# Patient Record
Sex: FEMALE | Race: BLACK OR AFRICAN AMERICAN | NOT HISPANIC OR LATINO | Employment: FULL TIME | URBAN - METROPOLITAN AREA
[De-identification: names, ages, dates, MRNs, and addresses within clinical notes are randomized per-mention and may not be internally consistent; named-entity substitution may affect disease eponyms.]

---

## 2017-01-01 ENCOUNTER — TRANSFERRED RECORDS (OUTPATIENT)
Dept: HEALTH INFORMATION MANAGEMENT | Facility: CLINIC | Age: 42
End: 2017-01-01

## 2017-03-14 ENCOUNTER — TRANSFERRED RECORDS (OUTPATIENT)
Dept: HEALTH INFORMATION MANAGEMENT | Facility: CLINIC | Age: 42
End: 2017-03-14

## 2017-03-15 ENCOUNTER — OFFICE VISIT (OUTPATIENT)
Dept: FAMILY MEDICINE | Facility: CLINIC | Age: 42
End: 2017-03-15
Payer: COMMERCIAL

## 2017-03-15 VITALS
HEART RATE: 102 BPM | SYSTOLIC BLOOD PRESSURE: 98 MMHG | OXYGEN SATURATION: 94 % | HEIGHT: 66 IN | RESPIRATION RATE: 14 BRPM | DIASTOLIC BLOOD PRESSURE: 68 MMHG | BODY MASS INDEX: 31.98 KG/M2 | WEIGHT: 199 LBS | TEMPERATURE: 100 F

## 2017-03-15 DIAGNOSIS — R07.0 THROAT PAIN: Primary | ICD-10-CM

## 2017-03-15 LAB
DEPRECATED S PYO AG THROAT QL EIA: NORMAL
MICRO REPORT STATUS: NORMAL
SPECIMEN SOURCE: NORMAL

## 2017-03-15 PROCEDURE — 87880 STREP A ASSAY W/OPTIC: CPT | Performed by: PHYSICIAN ASSISTANT

## 2017-03-15 PROCEDURE — 99213 OFFICE O/P EST LOW 20 MIN: CPT | Performed by: PHYSICIAN ASSISTANT

## 2017-03-15 NOTE — NURSING NOTE
"Chief Complaint   Patient presents with     URI     Cough, sorethroat, sinus congestion, elevated temp     ER F/U     Seen in urgent care yesterday, rapid strep was negative, on vicodin for sorethroat.       Initial BP 98/68 (BP Location: Left arm, Patient Position: Chair, Cuff Size: Adult Small)  Pulse 102  Temp 100  F (37.8  C) (Tympanic)  Resp 14  Ht 5' 6\" (1.676 m)  Wt 199 lb (90.3 kg)  LMP 02/22/2017 (Approximate)  SpO2 94%  Breastfeeding? No  BMI 32.12 kg/m2 Estimated body mass index is 32.12 kg/(m^2) as calculated from the following:    Height as of this encounter: 5' 6\" (1.676 m).    Weight as of this encounter: 199 lb (90.3 kg).  Medication Reconciliation: complete     Emilia Arias LPN  "

## 2017-03-15 NOTE — PROGRESS NOTES
"  SUBJECTIVE:                                                    Seble Lopez is a 41 year old female who presents to clinic today for the following health issues:    ED/UC Followup:    Facility:  [facility]  Date of visit: 03/14/2017  Reason for visit: URI  Current Status: Severe sore throat, elevated temp, cough. Rapid completed yesterday was negative     As above; patient at urgent care rapid strep negative yesterday.        ROS:  C: POSITIVE for fever- LG   Skin: Negative for worrisome rashes or lesions  ENT/MOUTH:POSITIVE for sore throat without difficulty swallowing.  Negative for ear pain, sinus pressure, hearing loss and tinnitus.  Resp: Positive for non-productive cough without shortness of breath  CV: Negative for chest pain or peripheral edema  GI: Negative for nausea, abdominal pain, heartburn, or change in bowel habits  MS: Negative for significant arthralgias or myalgias      PFSH: hx recurrent tonsillitis  No known ill exposure     Patient Active Problem List   Diagnosis     Allergic rhinitis     CARDIOVASCULAR SCREENING; LDL GOAL LESS THAN 160     Contraceptive management     Current Outpatient Prescriptions   Medication     etonogestrel-ethinyl estradiol (NUVARING) 0.12-0.015 MG/24HR vaginal ring     No current facility-administered medications for this visit.        OBJECTIVE:                                                    BP 98/68 (BP Location: Left arm, Patient Position: Chair, Cuff Size: Adult Small)  Pulse 102  Temp 100  F (37.8  C) (Tympanic)  Resp 14  Ht 5' 6\" (1.676 m)  Wt 199 lb (90.3 kg)  LMP 02/22/2017 (Approximate)  SpO2 94%  Breastfeeding? No  BMI 32.12 kg/m2  Body mass index is 32.12 kg/(m^2).  GENERAL: healthy, alert, in no acute distress  EYES: Grossly normal to inspection, EOMI, PERRL  HENT: Ear canals normal bilateral. TM pearly gray without effusion bilaterally.  Nasal mucosa mildly edematous with clear rhinorrhea.  Mouth- mucous membranes moist, no lesions or " ulcerations. Pharynx erythematous without petechia. and 2+ tonsillary hypertrophy. Uvula midline, clear post-nasal drainage.  Maxillary and frontal sinuses nontender to palpation bilaterally.  NECK: Non-tender, no adenopathy.  RESP: lungs clear to auscultation - no rales, no rhonchi, no wheezes  CV: regular rate and rhythm, normal S1 S2.  No peripheral edema.  SKIN: no suspicious lesions, no rashes  PSYCH: Alert and oriented times 3;  Able to articulate logical thoughts. Affect is normal.    Diagnostic test results:   Results for orders placed or performed in visit on 03/15/17 (from the past 24 hour(s))   Strep, Rapid Screen   Result Value Ref Range    Specimen Description Throat     Rapid Strep A Screen       NEGATIVE: No Group A streptococcal antigen detected by immunoassay, await   culture report.      Micro Report Status FINAL 03/15/2017         ASSESSMENT/PLAN:                                                        ICD-10-CM    1. Throat pain R07.0 Strep, Rapid Screen     Pending culture at urgent care- culture not done today at our clinic per patient request.   Continue monitoring symptoms and conservative therapies. She agrees.     Nicole Joy Siegler, PA-C  [facility]

## 2017-03-15 NOTE — MR AVS SNAPSHOT
"              After Visit Summary   3/15/2017    Seble Lopez    MRN: [id number]           Patient Information     Date Of Birth          1975        Visit Information        Provider Department      3/15/2017 9:30 AM Siegler, Nicole Joy, PA-C [facility]        Today's Diagnoses     Throat pain    -  1       Follow-ups after your visit        Who to contact     If you have questions or need follow up information about today's clinic visit or your schedule please contact [facility] directly at [phone].  Normal or non-critical lab and imaging results will be communicated to you by RotoHoghart, letter or phone within 4 business days after the clinic has received the results. If you do not hear from us within 7 days, please contact the clinic through RotoHoghart or phone. If you have a critical or abnormal lab result, we will notify you by phone as soon as possible.  Submit refill requests through [employer] or call your pharmacy and they will forward the refill request to us. Please allow 3 business days for your refill to be completed.          Additional Information About Your Visit        MyChart Information     [employer] gives you secure access to your electronic health record. If you see a primary care provider, you can also send messages to your care team and make appointments. If you have questions, please call your primary care clinic.  If you do not have a primary care provider, please call [phone] and they will assist you.        Care EveryWhere ID     This is your Care EveryWhere ID. This could be used by other organizations to access your [place] medical records  [id number]        Your Vitals Were     Pulse Temperature Respirations Height Last Period Pulse Oximetry    102 100  F (37.8  C) (Tympanic) 14 5' 6\" (1.676 m) 02/22/2017 (Approximate) 94%    Breastfeeding? BMI (Body Mass Index)                No 32.12 kg/m2           Blood Pressure from " Last 3 Encounters:   03/15/17 98/68   06/17/16 119/71   04/04/16 108/74    Weight from Last 3 Encounters:   03/15/17 199 lb (90.3 kg)   06/17/16 201 lb (91.2 kg)   04/04/16 197 lb 14.4 oz (89.8 kg)              We Performed the Following     Strep, Rapid Screen        Primary Care Provider Office Phone # Fax #    Alec Chris [phone]       [facility] [address]  [facility] [address]        Thank you!     Thank you for choosing [facility]  for your care. Our goal is always to provide you with excellent care. Hearing back from our patients is one way we can continue to improve our services. Please take a few minutes to complete the written survey that you may receive in the mail after your visit with us. Thank you!             Your Updated Medication List - Protect others around you: Learn how to safely use, store and throw away your medicines at www.disposemymeds.org.          This list is accurate as of: 3/15/17 10:39 AM.  Always use your most recent med list.                   Brand Name Dispense Instructions for use    etonogestrel-ethinyl estradiol 0.12-0.015 MG/24HR vaginal ring    NUVARING     Place 1 each vaginally every 28 days Reported on 3/15/2017

## 2017-03-17 LAB
BACTERIA SPEC CULT: NORMAL
MICRO REPORT STATUS: NORMAL
SPECIMEN SOURCE: NORMAL

## 2017-04-07 ENCOUNTER — OFFICE VISIT (OUTPATIENT)
Dept: FAMILY MEDICINE | Facility: CLINIC | Age: 42
End: 2017-04-07

## 2017-04-07 DIAGNOSIS — Z53.9 NO SHOW: Primary | ICD-10-CM

## 2017-04-07 NOTE — MR AVS SNAPSHOT
After Visit Summary   4/7/2017    Seble Lopez    MRN: [id number]           Patient Information     Date Of Birth          1975        Visit Information        Provider Department      4/7/2017 8:40 AM Ibrahima Amador PA-C [facility]        Today's Diagnoses     NO SHOW    -  1       Follow-ups after your visit        Who to contact     If you have questions or need follow up information about today's clinic visit or your schedule please contact [facility] directly at [phone].  Normal or non-critical lab and imaging results will be communicated to you by eOn Communicationshart, letter or phone within 4 business days after the clinic has received the results. If you do not hear from us within 7 days, please contact the clinic through LetsCramt or phone. If you have a critical or abnormal lab result, we will notify you by phone as soon as possible.  Submit refill requests through [employer] or call your pharmacy and they will forward the refill request to us. Please allow 3 business days for your refill to be completed.          Additional Information About Your Visit        MyChart Information     [employer] gives you secure access to your electronic health record. If you see a primary care provider, you can also send messages to your care team and make appointments. If you have questions, please call your primary care clinic.  If you do not have a primary care provider, please call [phone] and they will assist you.        Care EveryWhere ID     This is your Care EveryWhere ID. This could be used by other organizations to access your [place] medical records  [id number]        Your Vitals Were     Last Period                   02/22/2017 (Approximate)            Blood Pressure from Last 3 Encounters:   03/15/17 98/68   06/17/16 119/71   04/04/16 108/74    Weight from Last 3 Encounters:   03/15/17 199 lb (90.3 kg)   06/17/16 201 lb (91.2 kg)   04/04/16 197 lb 14.4 oz (89.8 kg)               Today, you had the following     No orders found for display       Primary Care Provider Office Phone # Fax #    Alec Chris [phone]       [facility] [address] [address]        Thank you!     Thank you for choosing [facility]  for your care. Our goal is always to provide you with excellent care. Hearing back from our patients is one way we can continue to improve our services. Please take a few minutes to complete the written survey that you may receive in the mail after your visit with us. Thank you!             Your Updated Medication List - Protect others around you: Learn how to safely use, store and throw away your medicines at www.disposemymeds.org.          This list is accurate as of: 4/7/17  9:09 AM.  Always use your most recent med list.                   Brand Name Dispense Instructions for use    etonogestrel-ethinyl estradiol 0.12-0.015 MG/24HR vaginal ring    NUVARING     Place 1 each vaginally every 28 days Reported on 3/15/2017

## 2017-04-27 ENCOUNTER — TELEPHONE (OUTPATIENT)
Dept: FAMILY MEDICINE | Facility: CLINIC | Age: 42
End: 2017-04-27

## 2017-04-27 NOTE — TELEPHONE ENCOUNTER
MP,  Please see below message.  All other provider schedules full  Please advise on DB today  Thanks,  Micaela PENA RN

## 2017-04-27 NOTE — TELEPHONE ENCOUNTER
Spoke with pt    Next 5 appointments (look out 90 days)     Apr 28, 2017 11:00 AM CDT   Office Visit with Lu Alfonso MD   [facility]    [address] [place]  [facility] [address]   [phone]                Micaela PENA RN

## 2017-04-27 NOTE — TELEPHONE ENCOUNTER
Reason for Call:  Other appointment    Detailed comments: pt had day off has lump on back and insurance is ending at end of month wondering by chance if u could work her in today for appt?    Phone Number Patient can be reached at: Cell number on file:    No relevant phone numbers on file.[phone]       Best Time: any    Can we leave a detailed message on this number? YES    Call taken on 4/27/2017 at 1:05 PM by Concetta Rubin

## 2017-04-28 ENCOUNTER — OFFICE VISIT (OUTPATIENT)
Dept: FAMILY MEDICINE | Facility: CLINIC | Age: 42
End: 2017-04-28
Payer: COMMERCIAL

## 2017-04-28 VITALS
HEIGHT: 66 IN | TEMPERATURE: 97.7 F | DIASTOLIC BLOOD PRESSURE: 72 MMHG | OXYGEN SATURATION: 96 % | HEART RATE: 74 BPM | SYSTOLIC BLOOD PRESSURE: 115 MMHG | WEIGHT: 198 LBS | BODY MASS INDEX: 31.82 KG/M2

## 2017-04-28 DIAGNOSIS — L72.0 EPIDERMOID CYST OF SKIN: Primary | ICD-10-CM

## 2017-04-28 DIAGNOSIS — L91.0 KELOID SCAR: ICD-10-CM

## 2017-04-28 PROCEDURE — 99214 OFFICE O/P EST MOD 30 MIN: CPT | Performed by: FAMILY MEDICINE

## 2017-04-28 NOTE — MR AVS SNAPSHOT
After Visit Summary   4/28/2017    Seble Lopez    MRN: [id number]           Patient Information     Date Of Birth          1975        Visit Information        Provider Department      4/28/2017 11:00 AM Lu Alfonso MD [facility]        Today's Diagnoses     Epidermoid cyst of skin    -  1    Keloid scar           Follow-ups after your visit        Additional Services     DERMATOLOGY REFERRAL       Your provider has referred you to: [facility]: [facility] [place] [phone]   [email or web address]  [email or web address]: [facility] - [place] [phone]   [email or web address]    Please be aware that coverage of these services is subject to the terms and limitations of your health insurance plan.  Call member services at your health plan with any benefit or coverage questions.      Please bring the following with you to your appointment:    (1) Any X-Rays, CTs or MRIs which have been performed.  Contact the facility where they were done to arrange for  prior to your scheduled appointment.    (2) List of current medications  (3) This referral request   (4) Any documents/labs given to you for this referral                  Who to contact     If you have questions or need follow up information about today's clinic visit or your schedule please contact [facility] directly at [phone].  Normal or non-critical lab and imaging results will be communicated to you by MyChart, letter or phone within 4 business days after the clinic has received the results. If you do not hear from us within 7 days, please contact the clinic through MyChart or phone. If you have a critical or abnormal lab result, we will notify you by phone as soon as possible.  Submit refill requests through [email or web address] or call your pharmacy and they will forward the refill request to us. Please allow 3 business days  "for your refill to be completed.          Additional Information About Your Visit        MyChart Information     [employer] gives you secure access to your electronic health record. If you see a primary care provider, you can also send messages to your care team and make appointments. If you have questions, please call your primary care clinic.  If you do not have a primary care provider, please call [phone] and they will assist you.        Care EveryWhere ID     This is your Care EveryWhere ID. This could be used by other organizations to access your [place] medical records  [id number]        Your Vitals Were     Pulse Temperature Height Pulse Oximetry BMI (Body Mass Index)       74 97.7  F (36.5  C) (Oral) 5' 6\" (1.676 m) 96% 31.96 kg/m2        Blood Pressure from Last 3 Encounters:   04/28/17 115/72   03/15/17 98/68   06/17/16 119/71    Weight from Last 3 Encounters:   04/28/17 198 lb (89.8 kg)   03/15/17 199 lb (90.3 kg)   06/17/16 201 lb (91.2 kg)              We Performed the Following     DERMATOLOGY REFERRAL        Primary Care Provider Office Phone # Fax #    Alec Chris [phone]       [facility] [address] [id number]        Thank you!     Thank you for choosing [facility]  for your care. Our goal is always to provide you with excellent care. Hearing back from our patients is one way we can continue to improve our services. Please take a few minutes to complete the written survey that you may receive in the mail after your visit with us. Thank you!             Your Updated Medication List - Protect others around you: Learn how to safely use, store and throw away your medicines at www.disposemymeds.org.          This list is accurate as of: 4/28/17 11:48 AM.  Always use your most recent med list.                   Brand Name Dispense Instructions for use    etonogestrel-ethinyl estradiol 0.12-0.015 MG/24HR vaginal ring    " NUVARING     Place 1 each vaginally every 28 days Reported on 3/15/2017

## 2017-04-28 NOTE — PROGRESS NOTES
"  SUBJECTIVE:                                                    Seble Lopez is a 41 year old female who presents to clinic today for the following health issues:  Noted a mass on left back, about a year ago or slightly more  No change in size. No associated pain    Chief Complaint   Patient presents with     Mass     lump under skin  left side of  middle back, been there for 1 year or so, no pain     OBJECTIVE-  /72  Pulse 74  Temp 97.7  F (36.5  C) (Oral)  Ht 5' 6\" (1.676 m)  Wt 198 lb (89.8 kg)  SpO2 96%  BMI 31.96 kg/m2  GENERAL APPEARANCE ADULT: Alert, no acute distress  RESP: lungs clear to auscultation   CV: normal rate, regular rhythm, no murmur or gallop  SKIN: left lateral mid back- easily palpable , skin color lump about an inch, oblong, with a punctum- most likely an epidermal cyst. Discussed minor procedure for excision and biopsy   Right shoulder had a scar, dark black about a cms, round- and patient reports its from  A biopsy that she did not need and it never cleared  It was benign    A/P:Lump on the back most likely benign but excision is advised  (L72.0) Epidermoid cyst of skin  (primary encounter diagnosis)  Comment: left lateral mid back about close to an inch  Plan: she has the option to be seen by DERMATOLOGY REFERRAL   or return office visit for excision here. The risk complications, scar of minor procedure in the clinic is explained to patient in detail       (L91.0) Keloid scar  Comment: right shoulder- previous biosy  Plan: DERMATOLOGY REFERRAL       She reports she gets annual mamm[facility] at [facility]- last one normal in jan 2017    The patient indicates understanding of these issues and agrees with the plan.    "

## 2017-04-28 NOTE — NURSING NOTE
"Chief Complaint   Patient presents with     Mass     lump under skin  left side of  middle back, been there for 1 year or so, no pain     /72  Pulse 74  Temp 97.7  F (36.5  C) (Oral)  Ht 5' 6\" (1.676 m)  Wt 198 lb (89.8 kg)  SpO2 96%  BMI 31.96 kg/m2 Estimated body mass index is 31.96 kg/(m^2) as calculated from the following:    Height as of this encounter: 5' 6\" (1.676 m).    Weight as of this encounter: 198 lb (89.8 kg).  BP completed using cuff size: regular       Health Maintenance due pending provider review:  Mammogram    Patient  Goes to [facility] they  PAP 2-3 years ago [facility] at [facility]  1/2017        Binh Caldwell CMA  "

## 2018-04-07 ENCOUNTER — HEALTH MAINTENANCE LETTER (OUTPATIENT)
Age: 43
End: 2018-04-07

## 2018-12-19 ENCOUNTER — TRANSFERRED RECORDS (OUTPATIENT)
Dept: HEALTH INFORMATION MANAGEMENT | Facility: CLINIC | Age: 43
End: 2018-12-19

## 2019-06-26 ENCOUNTER — OFFICE VISIT (OUTPATIENT)
Dept: FAMILY MEDICINE | Facility: CLINIC | Age: 44
End: 2019-06-26
Payer: COMMERCIAL

## 2019-06-26 VITALS
WEIGHT: 209.9 LBS | HEART RATE: 72 BPM | SYSTOLIC BLOOD PRESSURE: 117 MMHG | OXYGEN SATURATION: 97 % | DIASTOLIC BLOOD PRESSURE: 75 MMHG | TEMPERATURE: 98.5 F | HEIGHT: 66 IN | BODY MASS INDEX: 33.73 KG/M2

## 2019-06-26 DIAGNOSIS — Z00.00 ROUTINE GENERAL MEDICAL EXAMINATION AT A HEALTH CARE FACILITY: Primary | ICD-10-CM

## 2019-06-26 DIAGNOSIS — F40.243 FEAR OF FLYING: ICD-10-CM

## 2019-06-26 DIAGNOSIS — K64.9 HEMORRHOIDS, UNSPECIFIED HEMORRHOID TYPE: ICD-10-CM

## 2019-06-26 LAB
CHOLEST SERPL-MCNC: 191 MG/DL
GLUCOSE SERPL-MCNC: 87 MG/DL (ref 70–99)
HDLC SERPL-MCNC: 78 MG/DL
LDLC SERPL CALC-MCNC: 103 MG/DL
NONHDLC SERPL-MCNC: 113 MG/DL
TRIGL SERPL-MCNC: 49 MG/DL

## 2019-06-26 PROCEDURE — 82947 ASSAY GLUCOSE BLOOD QUANT: CPT | Performed by: FAMILY MEDICINE

## 2019-06-26 PROCEDURE — 99396 PREV VISIT EST AGE 40-64: CPT | Performed by: FAMILY MEDICINE

## 2019-06-26 PROCEDURE — 36415 COLL VENOUS BLD VENIPUNCTURE: CPT | Performed by: FAMILY MEDICINE

## 2019-06-26 PROCEDURE — 80061 LIPID PANEL: CPT | Performed by: FAMILY MEDICINE

## 2019-06-26 PROCEDURE — 99213 OFFICE O/P EST LOW 20 MIN: CPT | Mod: 25 | Performed by: FAMILY MEDICINE

## 2019-06-26 RX ORDER — LORAZEPAM 0.5 MG/1
0.5 TABLET ORAL EVERY 8 HOURS PRN
Qty: 10 TABLET | Refills: 0 | Status: SHIPPED | OUTPATIENT
Start: 2019-06-26 | End: 2020-07-08

## 2019-06-26 ASSESSMENT — MIFFLIN-ST. JEOR: SCORE: 1618.85

## 2019-06-26 NOTE — NURSING NOTE
"Chief Complaint   Patient presents with     Physical     /75   Pulse 72   Temp 98.5  F (36.9  C) (Oral)   Ht 1.676 m (5' 6\")   Wt 95.2 kg (209 lb 14.4 oz)   LMP 06/17/2019   SpO2 97%   BMI 33.88 kg/m   Estimated body mass index is 33.88 kg/m  as calculated from the following:    Height as of this encounter: 1.676 m (5' 6\").    Weight as of this encounter: 95.2 kg (209 lb 14.4 oz).        Health Maintenance due pending provider review:  Pap Smear    Pt reports pap completed at [facility], unsure of date, will call    Anupama Delgado CMA  "

## 2019-06-26 NOTE — PROGRESS NOTES
e2     SUBJECTIVE:   CC: Seble Lopez is an 43 year old woman who presents for preventive health visit.     Healthy Habits:     Getting at least 3 servings of Calcium per day:  Yes    Bi-annual eye exam:  Yes    Dental care twice a year:  Yes    Sleep apnea or symptoms of sleep apnea:  None    Diet:  Regular (no restrictions)    Frequency of exercise:  1 day/week    Duration of exercise:  15-30 minutes    Taking medications regularly:  Yes    Medication side effects:  None    PHQ-2 Total Score: 0    Additional concerns today:  No          PROBLEMS TO ADD ON...  Anxiety -   Fear of flying - panic attacks  Wonders about wellbutrin    Possible hemorrhoid    Today's PHQ-2 Score:   PHQ-2 ( 1999 [employer]) 6/26/2019   Q1: Little interest or pleasure in doing things 0   Q2: Feeling down, depressed or hopeless 0   PHQ-2 Score 0   Q1: Little interest or pleasure in doing things Not at all   Q2: Feeling down, depressed or hopeless Not at all   PHQ-2 Score 0       Abuse: Current or Past(Physical, Sexual or Emotional)- NO  Do you feel safe in your environment? YES    Social History     Tobacco Use     Smoking status: Never Smoker     Smokeless tobacco: Never Used   Substance Use Topics     Alcohol use: Yes     Alcohol/week: 0.0 oz     Comment: Occas         Alcohol Use 6/26/2019   Prescreen: >3 drinks/day or >7 drinks/week? No   Prescreen: >3 drinks/day or >7 drinks/week? -   No flowsheet data found.    Reviewed orders with patient.  Reviewed health maintenance and updated orders accordingly - Yes  Patient Active Problem List   Diagnosis     Allergic rhinitis     CARDIOVASCULAR SCREENING; LDL GOAL LESS THAN 160     Contraceptive management     History reviewed. No pertinent surgical history.    Social History     Tobacco Use     Smoking status: Never Smoker     Smokeless tobacco: Never Used   Substance Use Topics     Alcohol use: Yes     Alcohol/week: 0.0 oz     Comment: Occas     Family History   Problem Relation Age of Onset      "Coronary Artery Disease Mother      Family History Negative Father            Mammogram Screening: Patient under age 50, mutual decision reflected in health maintenance.      Pertinent mammograms are reviewed under the imaging tab.  History of abnormal Pap smear: NO - age 30-65 PAP every 5 years with negative HPV co-testing recommended - gets done at gynecology clinic  Pt signed CHRIS and will get copy of records     Reviewed and updated as needed this visit by clinical staff  Tobacco  Allergies  Meds  Problems  Med Hx  Surg Hx  Fam Hx  Soc Hx          Reviewed and updated as needed this visit by Provider  Tobacco  Allergies  Meds  Problems  Med Hx  Surg Hx  Fam Hx            Review of Systems  CONSTITUTIONAL: NEGATIVE for fever, chills, change in weight  INTEGUMENTARU/SKIN: NEGATIVE for worrisome rashes, moles or lesions  EYES: NEGATIVE for vision changes or irritation  ENT: NEGATIVE for ear, mouth and throat problems  RESP: NEGATIVE for significant cough or SOB  BREAST: NEGATIVE for masses, tenderness or discharge  CV: NEGATIVE for chest pain, palpitations or peripheral edema  GI: NEGATIVE for nausea, abdominal pain, heartburn, or change in bowel habits  : NEGATIVE for unusual urinary or vaginal symptoms. Periods are regular.  MUSCULOSKELETAL: NEGATIVE for significant arthralgias or myalgia  NEURO: NEGATIVE for weakness, dizziness or paresthesias  PSYCHIATRIC: NEGATIVE for changes in mood or affect     OBJECTIVE:   /75   Pulse 72   Temp 98.5  F (36.9  C) (Oral)   Ht 1.676 m (5' 6\")   Wt 95.2 kg (209 lb 14.4 oz)   LMP 06/17/2019   SpO2 97%   BMI 33.88 kg/m    Physical Exam  GENERAL: alert and no distress  EYES: Eyes grossly normal to inspection, PERRL and conjunctivae and sclerae normal  HENT: ear canals and TM's normal, nose and mouth without ulcers or lesions  NECK: no adenopathy, no asymmetry, masses, or scars and thyroid normal to palpation  RESP: lungs clear to auscultation - no " "rales, rhonchi or wheezes  CV: regular rate and rhythm, normal S1 S2, no S3 or S4, no murmur, click or rub, no peripheral edema and peripheral pulses strong  ABDOMEN: soft, nontender, no hepatosplenomegaly, no masses and bowel sounds normal  MS: no gross musculoskeletal defects noted, no edema  SKIN: no suspicious lesions or rashes  NEURO: Normal strength and tone, mentation intact and speech normal  PSYCH: mentation appears normal, affect normal/bright    Diagnostic Test Results:  Labs reviewed in [facility]  Pending labs    ASSESSMENT/PLAN:   1. Routine general medical examination at a health care facility  Routine screening   - Lipid panel reflex to direct LDL Fasting  - Glucose    2. Fear of flying  Will try prn ativan   Discussed risk vs benefit and potential side effects  - LORazepam (ATIVAN) 0.5 MG tablet; Take 1 tablet (0.5 mg) by mouth every 8 hours as needed for anxiety (for air travel)  Dispense: 10 tablet; Refill: 0  - OFFICE/OUTPT VISIT,EST,LEVL II    3. Hemorrhoids, unspecified hemorrhoid type  Referral to colorectal  - COLORECTAL SURGERY REFERRAL  - OFFICE/OUTPT VISIT,EST,LEVL II    COUNSELING:  Reviewed preventive health counseling, as reflected in patient instructions    Estimated body mass index is 33.88 kg/m  as calculated from the following:    Height as of this encounter: 1.676 m (5' 6\").    Weight as of this encounter: 95.2 kg (209 lb 14.4 oz).    Weight management plan: Discussed healthy diet and exercise guidelines     reports that she has never smoked. She has never used smokeless tobacco.      Counseling Resources:  ATP IV Guidelines  Pooled Cohorts Equation Calculator  Breast Cancer Risk Calculator  FRAX Risk Assessment  ICSI Preventive Guidelines  Dietary Guidelines for Americans, 2010  USDA's MyPlate  ASA Prophylaxis  Lung CA Screening    Sera Echevarria, DO  [facility]  "

## 2019-06-26 NOTE — RESULT ENCOUNTER NOTE
Dear Seble,   Your test results are all back -   -Cholesterol levels (LDL,HDL, Triglycerides) are normal.  ADVISE: rechecking in 1 year.   -Glucose (diabetic screening test) is normal.  Let us know if you have any questions.  -Sera Echevarria, DO

## 2019-08-02 ENCOUNTER — OFFICE VISIT (OUTPATIENT)
Dept: FAMILY MEDICINE | Facility: CLINIC | Age: 44
End: 2019-08-02
Payer: COMMERCIAL

## 2019-08-02 VITALS
HEIGHT: 66 IN | SYSTOLIC BLOOD PRESSURE: 110 MMHG | BODY MASS INDEX: 33.7 KG/M2 | WEIGHT: 209.7 LBS | DIASTOLIC BLOOD PRESSURE: 76 MMHG | RESPIRATION RATE: 14 BRPM | OXYGEN SATURATION: 96 % | HEART RATE: 96 BPM

## 2019-08-02 DIAGNOSIS — R59.1 LYMPHADENOPATHY: Primary | ICD-10-CM

## 2019-08-02 LAB
BASOPHILS # BLD AUTO: 0 10E9/L (ref 0–0.2)
BASOPHILS NFR BLD AUTO: 0.3 %
DIFFERENTIAL METHOD BLD: NORMAL
EOSINOPHIL # BLD AUTO: 0.2 10E9/L (ref 0–0.7)
EOSINOPHIL NFR BLD AUTO: 1.8 %
ERYTHROCYTE [DISTWIDTH] IN BLOOD BY AUTOMATED COUNT: 12.6 % (ref 10–15)
HCT VFR BLD AUTO: 39.8 % (ref 35–47)
HGB BLD-MCNC: 13.2 G/DL (ref 11.7–15.7)
LYMPHOCYTES # BLD AUTO: 2.6 10E9/L (ref 0.8–5.3)
LYMPHOCYTES NFR BLD AUTO: 25.5 %
MCH RBC QN AUTO: 30.8 PG (ref 26.5–33)
MCHC RBC AUTO-ENTMCNC: 33.2 G/DL (ref 31.5–36.5)
MCV RBC AUTO: 93 FL (ref 78–100)
MONOCYTES # BLD AUTO: 0.6 10E9/L (ref 0–1.3)
MONOCYTES NFR BLD AUTO: 6.3 %
NEUTROPHILS # BLD AUTO: 6.7 10E9/L (ref 1.6–8.3)
NEUTROPHILS NFR BLD AUTO: 66.1 %
PLATELET # BLD AUTO: 238 10E9/L (ref 150–450)
RBC # BLD AUTO: 4.29 10E12/L (ref 3.8–5.2)
WBC # BLD AUTO: 10.1 10E9/L (ref 4–11)

## 2019-08-02 PROCEDURE — 85025 COMPLETE CBC W/AUTO DIFF WBC: CPT | Performed by: PHYSICIAN ASSISTANT

## 2019-08-02 PROCEDURE — 99214 OFFICE O/P EST MOD 30 MIN: CPT | Performed by: PHYSICIAN ASSISTANT

## 2019-08-02 PROCEDURE — 36415 COLL VENOUS BLD VENIPUNCTURE: CPT | Performed by: PHYSICIAN ASSISTANT

## 2019-08-02 ASSESSMENT — MIFFLIN-ST. JEOR: SCORE: 1612.94

## 2019-08-02 NOTE — PROGRESS NOTES
"Subjective     Seble Lopez is a 44 year old female who presents to clinic today for the following health issues:    HPI   Mass      Duration: 4 days    Description (location/character/radiation): under the chin    Intensity:  moderate    Accompanying signs and symptoms: pain when touching    History (similar episodes/previous evaluation): None    Precipitating or alleviating factors: None    Therapies tried and outcome: tylenol and ibuprofen      She noticed this 4 days ago while on business trip.  Seemed to have a bit more swelling along neck, but that has improved.  She does have some tenderness.  This does cause quite a bit of anxiety.    BP Readings from Last 3 Encounters:   08/02/19 110/76   06/26/19 117/75   04/28/17 115/72    Wt Readings from Last 3 Encounters:   08/02/19 95.1 kg (209 lb 11.2 oz)   06/26/19 95.2 kg (209 lb 14.4 oz)   04/28/17 89.8 kg (198 lb)                      Reviewed and updated as needed this visit by Provider         Review of Systems   ROS COMP: Constitutional, HEENT, cardiovascular, pulmonary, gi and gu systems are negative, except as otherwise noted.      Objective    /76 (BP Location: Left arm, Patient Position: Sitting, Cuff Size: Adult Large)   Pulse 96   Resp 14   Ht 1.676 m (5' 6\")   Wt 95.1 kg (209 lb 11.2 oz)   LMP 07/12/2019 (Approximate)   SpO2 96%   BMI 33.85 kg/m    Body mass index is 33.85 kg/m .  Physical Exam   GENERAL: alert and no distress  EYES: Eyes grossly normal to inspection  HENT: ear canals and TM's normal, nose and mouth without ulcers or lesions  RESP: lungs clear to auscultation - no rales, rhonchi or wheezes  CV: regular rate and rhythm, normal S1 S2, no S3 or S4, no murmur, click or rub, no peripheral edema and peripheral pulses strong  PSYCH: mentation appears normal, affect normal/bright  LYMPH: submandibular tender, mobile lymphadenopathy.    Diagnostic Test Results:  Labs reviewed in Epic  Results for orders placed or performed in visit on " "08/02/19   CBC with platelets differential   Result Value Ref Range    WBC 10.1 4.0 - 11.0 10e9/L    RBC Count 4.29 3.8 - 5.2 10e12/L    Hemoglobin 13.2 11.7 - 15.7 g/dL    Hematocrit 39.8 35.0 - 47.0 %    MCV 93 78 - 100 fl    MCH 30.8 26.5 - 33.0 pg    MCHC 33.2 31.5 - 36.5 g/dL    RDW 12.6 10.0 - 15.0 %    Platelet Count 238 150 - 450 10e9/L    % Neutrophils 66.1 %    % Lymphocytes 25.5 %    % Monocytes 6.3 %    % Eosinophils 1.8 %    % Basophils 0.3 %    Absolute Neutrophil 6.7 1.6 - 8.3 10e9/L    Absolute Lymphocytes 2.6 0.8 - 5.3 10e9/L    Absolute Monocytes 0.6 0.0 - 1.3 10e9/L    Absolute Eosinophils 0.2 0.0 - 0.7 10e9/L    Absolute Basophils 0.0 0.0 - 0.2 10e9/L    Diff Method Automated Method            Assessment & Plan     1. Lymphadenopathy  Normal CBC.  Most likely reactive lymph node.  Discussed worrisome findings, which she is not showing any of, and when to follow up.  These can take a couple of weeks to resolve.  - CBC with platelets differential     BMI:   Estimated body mass index is 33.85 kg/m  as calculated from the following:    Height as of this encounter: 1.676 m (5' 6\").    Weight as of this encounter: 95.1 kg (209 lb 11.2 oz).               Return in about 2 weeks (around 8/16/2019).    Ibrahima Amador PA-C  [facility]        "

## 2019-08-02 NOTE — Clinical Note
Please abstract the following data from this visit with this patient into the appropriate field in Epic:Pap smear done on this date: 12/12/14 (approximately), by this group: [facility], results were negative with negative HPV.  See care everywhere

## 2019-08-02 NOTE — RESULT ENCOUNTER NOTE
Dear Seble    Your test results are attached, feel free to contact me via [employer] .    Great news, your white blood cell count is normal.  This also makes a bacterial infection less likely.  Most likely just that lymph node reacting to virus.  Give this the weekend, and if symptoms persist or worsen contact myself or Dr. Swathi Amador PA-C

## 2019-08-05 ENCOUNTER — OFFICE VISIT (OUTPATIENT)
Dept: FAMILY MEDICINE | Facility: CLINIC | Age: 44
End: 2019-08-05
Payer: COMMERCIAL

## 2019-08-05 VITALS
TEMPERATURE: 98.3 F | RESPIRATION RATE: 16 BRPM | HEIGHT: 66 IN | DIASTOLIC BLOOD PRESSURE: 70 MMHG | WEIGHT: 207 LBS | OXYGEN SATURATION: 97 % | SYSTOLIC BLOOD PRESSURE: 104 MMHG | HEART RATE: 79 BPM | BODY MASS INDEX: 33.27 KG/M2

## 2019-08-05 DIAGNOSIS — R59.1 LYMPHADENOPATHY: Primary | ICD-10-CM

## 2019-08-05 PROCEDURE — 99213 OFFICE O/P EST LOW 20 MIN: CPT | Performed by: PHYSICIAN ASSISTANT

## 2019-08-05 ASSESSMENT — MIFFLIN-ST. JEOR: SCORE: 1600.7

## 2019-08-05 NOTE — NURSING NOTE
"Chief Complaint   Patient presents with     Insect Bites     /70   Pulse 79   Temp 98.3  F (36.8  C) (Oral)   Resp 16   Ht 1.676 m (5' 6\")   Wt 93.9 kg (207 lb)   LMP 07/12/2019 (Approximate)   SpO2 97%   BMI 33.41 kg/m   Estimated body mass index is 33.41 kg/m  as calculated from the following:    Height as of this encounter: 1.676 m (5' 6\").    Weight as of this encounter: 93.9 kg (207 lb).  bp completed using cuff size: large       Health Maintenance addressed:  Mammogram and Pap Smear    Pt has had it but cant remember when and where will get info and let us know.    Lidia Hall MA     "

## 2019-08-05 NOTE — PROGRESS NOTES
"Subjective     Seble Lopez is a 44 year old female who presents to clinic today for the following health issues:    HPI   Pt states she has had swollen lymph nodes since Tuesday, tired/fatigued, and waking up in the morning covered in sweat pt did find a bug in her bed and wondering if its related.     45 y/o female here for follow up.  I did see patient on Friday for the same symptoms as above.  We did run a CBC that was normal.  She did find a bug in her bed, and does wonder if this could be related.  Over the weekend, her swelling has improved, and actually she is feeling better.  She was mostly concerned about possibility of lyme.  Denies any muscle aches, no rashes.    BP Readings from Last 3 Encounters:   08/05/19 104/70   08/02/19 110/76   06/26/19 117/75    Wt Readings from Last 3 Encounters:   08/05/19 93.9 kg (207 lb)   08/02/19 95.1 kg (209 lb 11.2 oz)   06/26/19 95.2 kg (209 lb 14.4 oz)                    Reviewed and updated as needed this visit by Provider         Review of Systems   ROS COMP: Constitutional, HEENT, cardiovascular, pulmonary, gi and gu systems are negative, except as otherwise noted.      Objective    /70   Pulse 79   Temp 98.3  F (36.8  C) (Oral)   Resp 16   Ht 1.676 m (5' 6\")   Wt 93.9 kg (207 lb)   LMP 07/12/2019 (Approximate)   SpO2 97%   BMI 33.41 kg/m    Body mass index is 33.41 kg/m .  Physical Exam   GENERAL: alert and no distress  EYES: Eyes grossly normal to inspection  RESP: lungs clear to auscultation - no rales, rhonchi or wheezes  CV: regular rate and rhythm, normal S1 S2, no S3 or S4, no murmur, click or rub, no peripheral edema and peripheral pulses strong  PSYCH: mentation appears normal, affect normal/bright  LYMPH: improved submandibular lymphadenopathy.      Diagnostic Test Results:  Labs reviewed in Epic        Assessment & Plan     1. Lymphadenopathy  Since she is improving, I do not think further work up is needed at this time.  Went over her CBC " "again.  She is in agreement to just watch symptoms over the next week.       BMI:   Estimated body mass index is 33.41 kg/m  as calculated from the following:    Height as of this encounter: 1.676 m (5' 6\").    Weight as of this encounter: 93.9 kg (207 lb).               No follow-ups on file.    Ibrahima Amador PA-C  [facility]    "

## 2019-11-21 ENCOUNTER — OFFICE VISIT (OUTPATIENT)
Dept: FAMILY MEDICINE | Facility: CLINIC | Age: 44
End: 2019-11-21
Payer: COMMERCIAL

## 2019-11-21 VITALS
DIASTOLIC BLOOD PRESSURE: 80 MMHG | WEIGHT: 206 LBS | TEMPERATURE: 98.1 F | BODY MASS INDEX: 33.11 KG/M2 | HEIGHT: 66 IN | SYSTOLIC BLOOD PRESSURE: 120 MMHG | RESPIRATION RATE: 16 BRPM | HEART RATE: 76 BPM | OXYGEN SATURATION: 98 %

## 2019-11-21 DIAGNOSIS — R07.0 THROAT PAIN: Primary | ICD-10-CM

## 2019-11-21 DIAGNOSIS — L30.9 DERMATITIS: ICD-10-CM

## 2019-11-21 LAB
DEPRECATED S PYO AG THROAT QL EIA: NORMAL
SPECIMEN SOURCE: NORMAL

## 2019-11-21 PROCEDURE — 87880 STREP A ASSAY W/OPTIC: CPT | Performed by: FAMILY MEDICINE

## 2019-11-21 PROCEDURE — 90471 IMMUNIZATION ADMIN: CPT | Performed by: FAMILY MEDICINE

## 2019-11-21 PROCEDURE — 87081 CULTURE SCREEN ONLY: CPT | Performed by: FAMILY MEDICINE

## 2019-11-21 PROCEDURE — 90686 IIV4 VACC NO PRSV 0.5 ML IM: CPT | Performed by: FAMILY MEDICINE

## 2019-11-21 PROCEDURE — 99213 OFFICE O/P EST LOW 20 MIN: CPT | Mod: 25 | Performed by: FAMILY MEDICINE

## 2019-11-21 ASSESSMENT — MIFFLIN-ST. JEOR: SCORE: 1596.16

## 2019-11-21 NOTE — PROGRESS NOTES
"Subjective     Seble Lopez is a 44 year old female who presents to clinic today for the following health issues:    HPI   RESPIRATORY SYMPTOMS      Duration: Saturday     Description  Rash around sinus peeling face and patches     Severity: mild    Accompanying signs and symptoms: sore throat, body aches, stuffy nose , productive cough yellow mucus     History (predisposing factors):  none    Precipitating or alleviating factors: None    Therapies tried and outcome:  Hydrocortisone, otc ointment    the patient has a new partner. She was kissing her partner and the following day she noticed erythema and peeling of the skin around the mouth. Symptoms resolved after hydrocortisone.     She has been having throat pain. Desires to know if she contracted strep throat.     Patient Active Problem List   Diagnosis     Allergic rhinitis     CARDIOVASCULAR SCREENING; LDL GOAL LESS THAN 160     Contraceptive management     No past surgical history on file.    Social History     Tobacco Use     Smoking status: Never Smoker     Smokeless tobacco: Never Used   Substance Use Topics     Alcohol use: Yes     Alcohol/week: 0.0 standard drinks     Comment: Occas     Family History   Problem Relation Age of Onset     Coronary Artery Disease Mother      Family History Negative Father          Reviewed and updated as needed this visit by Provider       Review of Systems   ROS COMP: Constitutional, HEENT, cardiovascular, pulmonary, gi and gu systems are negative, except as otherwise noted.      Objective    /80   Pulse 76   Temp 98.1  F (36.7  C) (Oral)   Resp 16   Ht 1.676 m (5' 6\")   Wt 93.4 kg (206 lb)   SpO2 98%   BMI 33.25 kg/m    Body mass index is 33.25 kg/m .  Physical Exam   GENERAL: healthy, alert and no distress  RESP: lungs clear to auscultation - no rales, rhonchi or wheezes  CV: regular rate and rhythm, normal S1 S2, no S3 or S4, no murmur, click or rub, no peripheral edema and peripheral pulses strong  SKIN: mild " erythema above the upper lip. No other skin abnormalities noted.       Diagnostic Test Results:  Labs reviewed in Epic        Assessment & Plan       ICD-10-CM    1. Throat pain R07.0 Strep, Rapid Screen     Beta strep group A culture   2. Dermatitis L30.9      Advised to continue with hydrocortisone.     Return in about 2 weeks (around 12/5/2019).    Emery Edwards MD  [facility]

## 2019-11-21 NOTE — NURSING NOTE
"Chief Complaint   Patient presents with     URI     /80   Pulse 76   Temp 98.1  F (36.7  C) (Oral)   Resp 16   Ht 1.676 m (5' 6\")   Wt 93.4 kg (206 lb)   SpO2 98%   BMI 33.25 kg/m   Estimated body mass index is 33.25 kg/m  as calculated from the following:    Height as of this encounter: 1.676 m (5' 6\").    Weight as of this encounter: 93.4 kg (206 lb).  bp completed using cuff size: regular       Health Maintenance addressed:  NONE    n/a    Lidia Hall CMA, MA     "

## 2019-11-22 LAB
BACTERIA SPEC CULT: NORMAL
SPECIMEN SOURCE: NORMAL

## 2019-11-26 ENCOUNTER — TELEPHONE (OUTPATIENT)
Dept: FAMILY MEDICINE | Facility: CLINIC | Age: 44
End: 2019-11-26

## 2019-11-26 NOTE — TELEPHONE ENCOUNTER
FS  Pt saw you 11/21 for sore throat, neg strep. Still having the same symptoms no improvement. Had symptoms now for 7 days, no change in symptoms. Pain worse in evenings and mornings. Still coughing up yellow mucus. Using Nyquil, ibuprofen, and gargling with salt water, helping some.     Further recommendations? would you like pt to come back in or ok to send Rx?    Thank you,  Manuela Quezada RN

## 2019-11-26 NOTE — TELEPHONE ENCOUNTER
Reason for call:  Patient reporting a symptom    Symptom or request: sore throat    Duration (how long have symptoms been present): 11.19.19    Have you been treated for this before? Yes - OV 11.21.19    Additional comments: Pt is saying her rapid strep test was negative but her throat is still very painful. Wondering what to do to relieve symptoms/if another OV is needed    Phone Number patient can be reached at:  Cell number on file:    Telephone Information:   Mobile [phone]     Best Time:  any    Can we leave a detailed message on this number:  YES    Call taken on 11/26/2019 at 7:38 AM by Karissa Mehta

## 2019-11-27 NOTE — TELEPHONE ENCOUNTER
I would recommend Lidocaine Viscous or anything from over the counter that contains lidocaine.     If symptoms fails to improve, I would recommend an office visit.     Grace

## 2019-12-02 ENCOUNTER — TELEPHONE (OUTPATIENT)
Dept: FAMILY MEDICINE | Facility: CLINIC | Age: 44
End: 2019-12-02

## 2019-12-02 ENCOUNTER — OFFICE VISIT (OUTPATIENT)
Dept: FAMILY MEDICINE | Facility: CLINIC | Age: 44
End: 2019-12-02
Payer: COMMERCIAL

## 2019-12-02 VITALS
RESPIRATION RATE: 14 BRPM | HEART RATE: 79 BPM | SYSTOLIC BLOOD PRESSURE: 123 MMHG | BODY MASS INDEX: 33.22 KG/M2 | OXYGEN SATURATION: 98 % | HEIGHT: 66 IN | WEIGHT: 206.7 LBS | DIASTOLIC BLOOD PRESSURE: 72 MMHG | TEMPERATURE: 98.4 F

## 2019-12-02 DIAGNOSIS — R07.0 THROAT PAIN: Primary | ICD-10-CM

## 2019-12-02 LAB
BASOPHILS # BLD AUTO: 0 10E9/L (ref 0–0.2)
BASOPHILS NFR BLD AUTO: 0.1 %
DIFFERENTIAL METHOD BLD: ABNORMAL
EOSINOPHIL # BLD AUTO: 0.1 10E9/L (ref 0–0.7)
EOSINOPHIL NFR BLD AUTO: 0.7 %
ERYTHROCYTE [DISTWIDTH] IN BLOOD BY AUTOMATED COUNT: 12.2 % (ref 10–15)
HCT VFR BLD AUTO: 38 % (ref 35–47)
HGB BLD-MCNC: 12.3 G/DL (ref 11.7–15.7)
LYMPHOCYTES # BLD AUTO: 2.3 10E9/L (ref 0.8–5.3)
LYMPHOCYTES NFR BLD AUTO: 17.2 %
MCH RBC QN AUTO: 29.9 PG (ref 26.5–33)
MCHC RBC AUTO-ENTMCNC: 32.4 G/DL (ref 31.5–36.5)
MCV RBC AUTO: 92 FL (ref 78–100)
MONOCYTES # BLD AUTO: 0.8 10E9/L (ref 0–1.3)
MONOCYTES NFR BLD AUTO: 6 %
NEUTROPHILS # BLD AUTO: 10.3 10E9/L (ref 1.6–8.3)
NEUTROPHILS NFR BLD AUTO: 76 %
PLATELET # BLD AUTO: 305 10E9/L (ref 150–450)
RBC # BLD AUTO: 4.12 10E12/L (ref 3.8–5.2)
T3FREE SERPL-MCNC: 2.4 PG/ML (ref 2.3–4.2)
T4 FREE SERPL-MCNC: 0.93 NG/DL (ref 0.76–1.46)
TSH SERPL DL<=0.005 MIU/L-ACNC: 2.15 MU/L (ref 0.4–4)
WBC # BLD AUTO: 13.6 10E9/L (ref 4–11)

## 2019-12-02 PROCEDURE — 87591 N.GONORRHOEAE DNA AMP PROB: CPT | Performed by: FAMILY MEDICINE

## 2019-12-02 PROCEDURE — 84439 ASSAY OF FREE THYROXINE: CPT | Performed by: FAMILY MEDICINE

## 2019-12-02 PROCEDURE — 85025 COMPLETE CBC W/AUTO DIFF WBC: CPT | Performed by: FAMILY MEDICINE

## 2019-12-02 PROCEDURE — 84481 FREE ASSAY (FT-3): CPT | Performed by: FAMILY MEDICINE

## 2019-12-02 PROCEDURE — 36415 COLL VENOUS BLD VENIPUNCTURE: CPT | Performed by: FAMILY MEDICINE

## 2019-12-02 PROCEDURE — 84443 ASSAY THYROID STIM HORMONE: CPT | Performed by: FAMILY MEDICINE

## 2019-12-02 PROCEDURE — 99214 OFFICE O/P EST MOD 30 MIN: CPT | Performed by: FAMILY MEDICINE

## 2019-12-02 RX ORDER — AZITHROMYCIN 250 MG/1
TABLET, FILM COATED ORAL
Qty: 6 TABLET | Refills: 0 | Status: SHIPPED | OUTPATIENT
Start: 2019-12-02 | End: 2019-12-27

## 2019-12-02 RX ORDER — PREDNISONE 20 MG/1
20 TABLET ORAL DAILY
COMMUNITY
End: 2020-07-08

## 2019-12-02 ASSESSMENT — PAIN SCALES - GENERAL: PAINLEVEL: SEVERE PAIN (7)

## 2019-12-02 ASSESSMENT — MIFFLIN-ST. JEOR: SCORE: 1599.34

## 2019-12-02 NOTE — TELEPHONE ENCOUNTER
FS,  Pt wanted to report per below that she has been taking 20 mg of prednisone.  Thanks,  Ashley Mcdermott RN      Spoke to pharm and gave verbal order to pharm for Zithromax order from today as the E-Prescribe had not gone through yet.    Informed pt also.    Ashlye Mcdermott RN

## 2019-12-02 NOTE — TELEPHONE ENCOUNTER
Reason for Call:  Medication or medication refill:    Do you use a [facility]?  Name of the pharmacy and phone number for the current request:   [id number] [address]    Name of the medication requested:   azithromycin (ZITHROMAX) 250 MG tablet    Other request: The Rx is stuck in transmission. The Pt is at the pharmacy is is asking if we could fax this RX instead. She also wanted to report that she is taking 20mg of prednisone.     Can we leave a detailed message on this number? YES    Phone number patient can be reached at: Cell number on file:    Telephone Information:   Mobile [phone]       Best Time: Any    Call taken on 12/2/2019 at 10:42 AM by Christie Morales

## 2019-12-02 NOTE — PROGRESS NOTES
"Subjective     Seble Lopez is a 44 year old female who presents to clinic today for the following health issues:    HPI   RESPIRATORY SYMPTOMS      Duration: 2 weeks    Description  sore throat and cough    Severity: moderate    Accompanying signs and symptoms: None    History (predisposing factors):  none    Precipitating or alleviating factors: None    Therapies tried and outcome:  Prednisone     She continues to have throat pain. Denies any lymphadenopathy or neck pain but feels that the pain is deep.   Worse at night.   Denies any fevers.     She started to take prednisone OTC prescribed by am anastasia GIRALDO. Desires to try antibiotics.     Patient Active Problem List   Diagnosis     Allergic rhinitis     CARDIOVASCULAR SCREENING; LDL GOAL LESS THAN 160     Contraceptive management     No past surgical history on file.    Social History     Tobacco Use     Smoking status: Never Smoker     Smokeless tobacco: Never Used   Substance Use Topics     Alcohol use: Yes     Alcohol/week: 0.0 standard drinks     Comment: Occas     Family History   Problem Relation Age of Onset     Coronary Artery Disease Mother      Family History Negative Father            Reviewed and updated as needed this visit by Provider         Review of Systems   ROS COMP: Constitutional, HEENT, cardiovascular, pulmonary, gi and gu systems are negative, except as otherwise noted.      Objective    /72 (BP Location: Left arm, Patient Position: Sitting, Cuff Size: Adult Large)   Pulse 79   Temp 98.4  F (36.9  C) (Oral)   Resp 14   Ht 1.676 m (5' 6\")   Wt 93.8 kg (206 lb 11.2 oz)   LMP 11/11/2019 (Approximate)   SpO2 98%   BMI 33.36 kg/m    Body mass index is 33.36 kg/m .  Physical Exam   GENERAL: healthy, alert and no distress  EYES: Eyes grossly normal to inspection, PERRL and conjunctivae and sclerae normal  HENT: ear canals and TM's normal, nose and mouth without ulcers or lesions  NECK: no adenopathy, no asymmetry, masses, or scars and " thyroid normal to palpation  RESP: lungs clear to auscultation - no rales, rhonchi or wheezes  CV: regular rate and rhythm, normal S1 S2, no S3 or S4, no murmur, click or rub, no peripheral edema and peripheral pulses strong  MS: no gross musculoskeletal defects noted, no edema    Diagnostic Test Results:  Labs reviewed in Epic  Results for orders placed or performed in visit on 12/02/19 (from the past 24 hour(s))   CBC with platelets differential   Result Value Ref Range    WBC 13.6 (H) 4.0 - 11.0 10e9/L    RBC Count 4.12 3.8 - 5.2 10e12/L    Hemoglobin 12.3 11.7 - 15.7 g/dL    Hematocrit 38.0 35.0 - 47.0 %    MCV 92 78 - 100 fl    MCH 29.9 26.5 - 33.0 pg    MCHC 32.4 31.5 - 36.5 g/dL    RDW 12.2 10.0 - 15.0 %    Platelet Count 305 150 - 450 10e9/L    % Neutrophils 76.0 %    % Lymphocytes 17.2 %    % Monocytes 6.0 %    % Eosinophils 0.7 %    % Basophils 0.1 %    Absolute Neutrophil 10.3 (H) 1.6 - 8.3 10e9/L    Absolute Lymphocytes 2.3 0.8 - 5.3 10e9/L    Absolute Monocytes 0.8 0.0 - 1.3 10e9/L    Absolute Eosinophils 0.1 0.0 - 0.7 10e9/L    Absolute Basophils 0.0 0.0 - 0.2 10e9/L    Diff Method Automated Method            Assessment & Plan       ICD-10-CM    1. Throat pain R07.0 T3, Free     T4, free     TSH     azithromycin (ZITHROMAX) 250 MG tablet     CBC with platelets differential     Neisseria gonorrhoeae PCR     Labs ordered today.   Mild increase in her WBC which is an indicative of an infection.  Will wait for the rest of her labs.       Return in about 2 weeks (around 12/16/2019).    Emery Edwards MD  [facility]

## 2019-12-03 LAB
N GONORRHOEA DNA SPEC QL NAA+PROBE: NEGATIVE
SPECIMEN SOURCE: NORMAL

## 2019-12-27 ENCOUNTER — TELEPHONE (OUTPATIENT)
Dept: FAMILY MEDICINE | Facility: CLINIC | Age: 44
End: 2019-12-27

## 2019-12-27 DIAGNOSIS — R07.0 THROAT PAIN: ICD-10-CM

## 2019-12-27 RX ORDER — AZITHROMYCIN 250 MG/1
TABLET, FILM COATED ORAL
Qty: 6 TABLET | Refills: 0 | Status: SHIPPED | OUTPATIENT
Start: 2019-12-27 | End: 2020-07-08

## 2019-12-27 NOTE — TELEPHONE ENCOUNTER
"FS,  Pt requesting another zpack.  She says she started the last axb on 12/2 and symptoms went away until  12/23 and then \"same sore throat and cough\" returned.  Pended med and pharm.  Did let her know apt best- no openings yet today.  Please advise.  Thanks,  Ashley Mcdermott RN      "

## 2019-12-27 NOTE — TELEPHONE ENCOUNTER
Reason for Call:  Medication or medication refill:    Do you use a [facility]?  Name of the pharmacy and phone number for the current request: .   [id number] - [address]      Name of the medication requested: azithromycin (ZITHROMAX) 250 MG tablet    Other request: None    Can we leave a detailed message on this number? YES    Phone number patient can be reached at: Home number on file [phone] (home)    Best Time: Any    Call taken on 12/27/2019 at 10:10 AM by Shy Menendez

## 2019-12-27 NOTE — TELEPHONE ENCOUNTER
I agree with your assessment.   Patient needs to be seen if symptoms fails to improve despite taking azithromycin.     Grace

## 2020-02-16 ENCOUNTER — HEALTH MAINTENANCE LETTER (OUTPATIENT)
Age: 45
End: 2020-02-16

## 2020-07-08 ENCOUNTER — OFFICE VISIT (OUTPATIENT)
Dept: FAMILY MEDICINE | Facility: CLINIC | Age: 45
End: 2020-07-08
Payer: COMMERCIAL

## 2020-07-08 VITALS
WEIGHT: 205.5 LBS | TEMPERATURE: 98.2 F | OXYGEN SATURATION: 97 % | SYSTOLIC BLOOD PRESSURE: 108 MMHG | HEIGHT: 66 IN | HEART RATE: 75 BPM | DIASTOLIC BLOOD PRESSURE: 70 MMHG | BODY MASS INDEX: 33.03 KG/M2

## 2020-07-08 DIAGNOSIS — Z82.49 FAMILY HISTORY OF ISCHEMIC HEART DISEASE: ICD-10-CM

## 2020-07-08 DIAGNOSIS — Z00.00 ROUTINE GENERAL MEDICAL EXAMINATION AT A HEALTH CARE FACILITY: Primary | ICD-10-CM

## 2020-07-08 LAB
CHOLEST SERPL-MCNC: 179 MG/DL
GLUCOSE SERPL-MCNC: 91 MG/DL (ref 70–99)
HDLC SERPL-MCNC: 67 MG/DL
LDLC SERPL CALC-MCNC: 103 MG/DL
NONHDLC SERPL-MCNC: 112 MG/DL
TRIGL SERPL-MCNC: 44 MG/DL

## 2020-07-08 PROCEDURE — 82947 ASSAY GLUCOSE BLOOD QUANT: CPT | Performed by: FAMILY MEDICINE

## 2020-07-08 PROCEDURE — 83516 IMMUNOASSAY NONANTIBODY: CPT | Performed by: FAMILY MEDICINE

## 2020-07-08 PROCEDURE — 80061 LIPID PANEL: CPT | Performed by: FAMILY MEDICINE

## 2020-07-08 PROCEDURE — 93000 ELECTROCARDIOGRAM COMPLETE: CPT | Performed by: FAMILY MEDICINE

## 2020-07-08 PROCEDURE — 36415 COLL VENOUS BLD VENIPUNCTURE: CPT | Performed by: FAMILY MEDICINE

## 2020-07-08 PROCEDURE — 99396 PREV VISIT EST AGE 40-64: CPT | Performed by: FAMILY MEDICINE

## 2020-07-08 ASSESSMENT — MIFFLIN-ST. JEOR: SCORE: 1593.89

## 2020-07-08 NOTE — NURSING NOTE
"Chief Complaint   Patient presents with     Physical     /70   Pulse 75   Temp 98.2  F (36.8  C) (Oral)   Ht 1.676 m (5' 6\")   Wt 93.2 kg (205 lb 8 oz)   LMP 07/02/2020   SpO2 97%   BMI 33.17 kg/m   Estimated body mass index is 33.17 kg/m  as calculated from the following:    Height as of this encounter: 1.676 m (5' 6\").    Weight as of this encounter: 93.2 kg (205 lb 8 oz).  bp completed using cuff size: regular      Health Maintenance addressed:  Pap Smear    Pt declines to have pap.    Savi Biggs MA    "

## 2020-07-08 NOTE — PROGRESS NOTES
SUBJECTIVE:   CC: Seble Lopez is an 44 year old woman who presents for preventive health visit.     Healthy Habits:    Getting at least 3 servings of Calcium per day:  NO    Bi-annual eye exam:  Yes    Dental care twice a year:  Yes (but overdue)    Sleep apnea or symptoms of sleep apnea:  None    Diet:  Regular (no restrictions)    Frequency of exercise:  2-3 days/week    Duration of exercise:  30-45 minutes    Taking medications regularly:  Not Applicable    Barriers to taking medications:  Not applicable    Medication side effects:  Not applicable    PHQ-2 Total Score:    Additional concerns today:  Yes (chest pain and pressure)              Today's PHQ-2 Score:   PHQ-2 ( 1999 Pfizer) 7/8/2020   Q1: Little interest or pleasure in doing things 0   Q2: Feeling down, depressed or hopeless 0   PHQ-2 Score 0   Q1: Little interest or pleasure in doing things -   Q2: Feeling down, depressed or hopeless -   PHQ-2 Score -       Abuse: Current or Past(Physical, Sexual or Emotional)- No  Do you feel safe in your environment? Yes        Social History     Tobacco Use     Smoking status: Never Smoker     Smokeless tobacco: Never Used   Substance Use Topics     Alcohol use: Yes     Alcohol/week: 0.0 standard drinks     Comment: Occas     If you drink alcohol do you typically have >3 drinks per day or >7 drinks per week? No     No flowsheet data found.    Reviewed orders with patient.  Reviewed health maintenance and updated orders accordingly - Yes  Patient Active Problem List   Diagnosis     Allergic rhinitis     CARDIOVASCULAR SCREENING; LDL GOAL LESS THAN 160     Contraceptive management     Family history of ischemic heart disease     No past surgical history on file.    Social History     Tobacco Use     Smoking status: Never Smoker     Smokeless tobacco: Never Used   Substance Use Topics     Alcohol use: Yes     Alcohol/week: 0.0 standard drinks     Comment: Occas     Family History   Problem Relation Age of Onset      "Coronary Artery Disease Mother      Family History Negative Father             Mammogram Screening: Patient under age 50, mutual decision reflected in health maintenance.      Pertinent mammograms are reviewed under the imaging tab.  History of abnormal Pap smear: NO - age 30-65 PAP every 5 years with negative HPV co-testing recommended - Sees gynecologist -   Will have her send copy of results     Reviewed and updated as needed this visit by clinical staff  Tobacco  Allergies  Meds         Reviewed and updated as needed this visit by Provider            Review of Systems  CONSTITUTIONAL: NEGATIVE for fever, chills, change in weight  INTEGUMENTARU/SKIN: NEGATIVE for worrisome rashes, moles or lesions  EYES: NEGATIVE for vision changes or irritation  ENT: NEGATIVE for ear, mouth and throat problems  RESP: NEGATIVE for significant cough or SOB  BREAST: NEGATIVE for masses, tenderness or discharge  CV: atytpical chest pain  GI: NEGATIVE for nausea, abdominal pain, heartburn, or change in bowel habits  : NEGATIVE for unusual urinary or vaginal symptoms. Periods are regular.  MUSCULOSKELETAL: NEGATIVE for significant arthralgias or myalgia  NEURO: NEGATIVE for weakness, dizziness or paresthesias  PSYCHIATRIC: NEGATIVE for changes in mood or affect     OBJECTIVE:   /70   Pulse 75   Temp 98.2  F (36.8  C) (Oral)   Ht 1.676 m (5' 6\")   Wt 93.2 kg (205 lb 8 oz)   LMP 07/02/2020   SpO2 97%   BMI 33.17 kg/m    Physical Exam  GENERAL: healthy, alert and no distress  EYES: Eyes grossly normal to inspection, PERRL and conjunctivae and sclerae normal  HENT: ear canals and TM's normal, nose and mouth without ulcers or lesions  NECK: no adenopathy, no asymmetry, masses, or scars and thyroid normal to palpation  RESP: lungs clear to auscultation - no rales, rhonchi or wheezes  CV: regular rate and rhythm, normal S1 S2, no S3 or S4, no murmur, click or rub, no peripheral edema and peripheral pulses strong  ABDOMEN: " "soft, nontender, no hepatosplenomegaly, no masses and bowel sounds normal  MS: no gross musculoskeletal defects noted, no edema  SKIN: no suspicious lesions or rashes  NEURO: Normal strength and tone, mentation intact and speech normal  PSYCH: mentation appears normal, affect normal/bright    Diagnostic Test Results:  Labs reviewed in Epic    ASSESSMENT/PLAN:   1. Routine general medical examination at a health care facility     - Lipid panel reflex to direct LDL Fasting  - Glucose  - Tissue transglutaminase jillian IgA and IgG    2. Family history of ischemic heart disease     - EKG 12-lead complete w/read - Clinics    COUNSELING:  Reviewed preventive health counseling, as reflected in patient instructions    Estimated body mass index is 33.17 kg/m  as calculated from the following:    Height as of this encounter: 1.676 m (5' 6\").    Weight as of this encounter: 93.2 kg (205 lb 8 oz).    Weight management plan: Discussed healthy diet and exercise guidelines     reports that she has never smoked. She has never used smokeless tobacco.      Counseling Resources:  ATP IV Guidelines  Pooled Cohorts Equation Calculator  Breast Cancer Risk Calculator  FRAX Risk Assessment  ICSI Preventive Guidelines  Dietary Guidelines for Americans, 2010  USDA's MyPlate  ASA Prophylaxis  Lung CA Screening    Sera Echevarria, DO  [facility]  "

## 2020-07-09 LAB
TTG IGA SER-ACNC: <1 U/ML
TTG IGG SER-ACNC: <1 U/ML

## 2020-07-09 NOTE — RESULT ENCOUNTER NOTE
Dear Seble,   Your test results are all back -   -All of your labs are normal.  The celiac test (gluten) was negative/normal.  Let us know if you have any questions.  -Sera Echevarria, DO

## 2020-09-15 ENCOUNTER — VIRTUAL VISIT (OUTPATIENT)
Dept: FAMILY MEDICINE | Facility: OTHER | Age: 45
End: 2020-09-15

## 2020-09-15 DIAGNOSIS — Z20.822 SUSPECTED COVID-19 VIRUS INFECTION: Primary | ICD-10-CM

## 2020-09-15 NOTE — PROGRESS NOTES
"Date: 09/15/2020 09:17:09  Clinician: Nestor Simental  Clinician NPI: [id number]  Patient: Seble Lopez  Patient : 1975  Patient Address: [address] [place] [id number]  Patient Phone: [phone]  Visit Protocol: URI  Patient Summary:  Seble is a 45 year old ( : 1975 ) female who initiated a [facility] Visit for COVID-19 (Coronavirus) evaluation and screening. When asked the question \"Please sign me up to receive news, health information and promotions. \", Seble responded \"No\".    Seble states her symptoms started gradually 5-6 days ago. After her symptoms started, they improved and then got worse again.   Her symptoms consist of anosmia and malaise. She is experiencing mild difficulty breathing with activities but can speak normally in full sentences.   Seble denies having ear pain, facial pain or pressure, fever, vomiting, rhinitis, nausea, wheezing, sore throat, teeth pain, ageusia, diarrhea, cough, nasal congestion, headache, chills, enlarged lymph nodes, and myalgias. She also denies taking antibiotic medication in the past month and having recent facial or sinus surgery in the past 60 days.    Pertinent COVID-19 (Coronavirus) information  In the past 14 days, Seble has not worked in a congregate living setting.   She does not work or volunteer as healthcare worker or a  and does not work or volunteer in a healthcare facility.   Seble also has not lived in a congregate living setting in the past 14 days. She does not live with a healthcare worker.   Seble has not had a close contact with a laboratory-confirmed COVID-19 patient within 14 days of symptom onset.   Since 2019, Seble and has had upper respiratory infection (URI) or influenza-like illness. Has not been diagnosed with lab-confirmed COVID-19 test      Date(s) of previous URI or influenza-like illness (free-text): Dec 2019     Symptoms Seble experienced during previous URI or influenza-like illness as reported by the " patient (free-text): fever, chills, extreme sore throat. tested neg for strep but it persisted and I got antibiotics        Pertinent medical history  Seble does not get yeast infections when she takes antibiotics.   Seble does not need a return to work/school note.   Weight: 208 lbs   Seble does not smoke or use smokeless tobacco.   She denies pregnancy and denies breastfeeding. She is currently menstruating.   Additional information as reported by the patient (free text): I get short of breath when walking briskly or up stairs. Have felt very tired lately. This morning noticed loss of smell.   Weight: 208 lbs    MEDICATIONS: No current medications, ALLERGIES: Penicillins  Clinician Response:  Dear Seble,   Your symptoms show that you may have coronavirus (COVID-19). This illness can cause fever, cough and trouble breathing. Many people get a mild case and get better on their own. Some people can get very sick.  What should I do?  We would like to test you for this virus.   1. Please call [phone] to schedule your visit. Explain that you were referred by [facility] to have a COVID-19 test. Be ready to share your [facility] visit ID number.  The following will serve as your written order for this COVID Test, ordered by me, for the indication of suspected COVID [Z20.828]: The test will be ordered in [employer], our electronic health record, after you are scheduled. It will show as ordered and authorized by Mikhail Suero MD.  Order: COVID-19 (Coronavirus) PCR [facility]TOMATIC [facility].      2. When it's time for your COVID test:  Stay at least 6 feet away from others. (If someone will drive you to your test, stay in the backseat, as far away from the  as you can.)   Cover your mouth and nose with a mask, tissue or washcloth.  Go straight to the testing site. Don't make any stops on the way there or back.      3.Starting now: Stay home and away from others (self-isolate) until:   You've had no fever---and no medicine  "that reduces fever---for one full day (24 hours). And...   Your other symptoms have gotten better. For example, your cough or breathing has improved. And...   At least 10 days have passed since your symptoms started.       During this time, don't leave the house except for testing or medical care.   Stay in your own room, even for meals. Use your own bathroom if you can.   Stay away from others in your home. No hugging, kissing or shaking hands. No visitors.  Don't go to work, school or anywhere else.    Clean \"high touch\" surfaces often (doorknobs, counters, handles, etc.). Use a household cleaning spray or wipes. You'll find a full list of  on the EPA website: www.epa.gov/pesticide-registration/list-n-disinfectants-use-against-sars-cov-2.   Cover your mouth and nose with a mask, tissue or washcloth to avoid spreading germs.  Wash your hands and face often. Use soap and water.  Caregivers in these groups are at risk for severe illness due to COVID-19:  o People 65 years and older  o People who live in a nursing home or long-term care facility  o People with chronic disease (lung, heart, cancer, diabetes, kidney, liver, immunologic)  o People who have a weakened immune system, including those who:   Are in cancer treatment  Take medicine that weakens the immune system, such as corticosteroids  Had a bone marrow or organ transplant  Have an immune deficiency  Have poorly controlled HIV or AIDS  Are obese (body mass index of 40 or higher)  Smoke regularly   o Caregivers should wear gloves while washing dishes, handling laundry and cleaning bedrooms and bathrooms.  o Use caution when washing and drying laundry: Don't shake dirty laundry, and use the warmest water setting that you can.  o For more tips, go to www.cdc.gov/coronavirus/2019-ncov/downloads/10Things.pdf.    4.Sign up for [employer]. We know it's scary to hear that you might have COVID-19. We want to track your symptoms to make sure you're okay over " the next 2 weeks. Please look for an email from [employer]---this is a free, online program that we'll use to keep in touch. To sign up, follow the link in the email. Learn more at [email or web address]  How can I take care of myself?   Get lots of rest. Drink extra fluids (unless a doctor has told you not to).   Take Tylenol (acetaminophen) for fever or pain. If you have liver or kidney problems, ask your family doctor if it's okay to take Tylenol.   Adults can take either:    650 mg (two 325 mg pills) every 4 to 6 hours, or...   1,000 mg (two 500 mg pills) every 8 hours as needed.    Note: Don't take more than 3,000 mg in one day. Acetaminophen is found in many medicines (both prescribed and over-the-counter medicines). Read all labels to be sure you don't take too much.   For children, check the Tylenol bottle for the right dose. The dose is based on the child's age or weight.    If you have other health problems (like cancer, heart failure, an organ transplant or severe kidney disease): Call your specialty clinic if you don't feel better in the next 2 days.       Know when to call 911. Emergency warning signs include:    Trouble breathing or shortness of breath Pain or pressure in the chest that doesn't go away Feeling confused like you haven't felt before, or not being able to wake up Bluish-colored lips or face.  Where can I get more information?   [facility] -- About COVID-19: [email or web address]   CDC -- What to Do If You're Sick: www.cdc.gov/coronavirus/2019-ncov/about/steps-when-sick.html   CDC -- Ending Home Isolation: www.cdc.gov/coronavirus/2019-ncov/hcp/disposition-in-home-patients.html   CDC -- Caring for Someone: www.cdc.gov/coronavirus/2019-ncov/if-you-are-sick/care-for-someone.html   [facility] -- Interim Guidance for Hospital Discharge to Home: www.health.[facility].mn./diseases/coronavirus/hcp/hospdischarge.pdf   [facility] clinical trials (COVID-19 research  studies): clinicalaffairs.[facility]-clinical-trials    Below are the COVID-19 hotlines at the [place] [facility] of [facility] ([facility]). Interpreters are available.    For health questions: Call 465-178-9346 or 1-707.791.7812 (7 a.m. to 7 p.m.) For questions about schools and childcare: Call 271-850-1624 or 1-573.697.5828 (7 a.m. to 7 p.m.)    Diagnosis: Other malaise  Diagnosis ICD: R53.81

## 2020-09-16 DIAGNOSIS — Z20.822 SUSPECTED COVID-19 VIRUS INFECTION: ICD-10-CM

## 2020-09-16 PROCEDURE — U0003 INFECTIOUS AGENT DETECTION BY NUCLEIC ACID (DNA OR RNA); SEVERE ACUTE RESPIRATORY SYNDROME CORONAVIRUS 2 (SARS-COV-2) (CORONAVIRUS DISEASE [COVID-19]), AMPLIFIED PROBE TECHNIQUE, MAKING USE OF HIGH THROUGHPUT TECHNOLOGIES AS DESCRIBED BY CMS-2020-01-R: HCPCS | Performed by: FAMILY MEDICINE

## 2020-09-17 LAB
SARS-COV-2 RNA SPEC QL NAA+PROBE: NOT DETECTED
SPECIMEN SOURCE: NORMAL

## 2020-11-29 ENCOUNTER — HEALTH MAINTENANCE LETTER (OUTPATIENT)
Age: 45
End: 2020-11-29

## 2021-03-24 ENCOUNTER — IMMUNIZATION (OUTPATIENT)
Dept: NURSING | Facility: CLINIC | Age: 46
End: 2021-03-24
Payer: COMMERCIAL

## 2021-03-24 PROCEDURE — 0001A PR COVID VAC PFIZER DIL RECON 30 MCG/0.3 ML IM: CPT

## 2021-03-24 PROCEDURE — 91300 PR COVID VAC PFIZER DIL RECON 30 MCG/0.3 ML IM: CPT

## 2021-04-01 ENCOUNTER — NURSE TRIAGE (OUTPATIENT)
Dept: NURSING | Facility: CLINIC | Age: 46
End: 2021-04-01

## 2021-04-01 NOTE — TELEPHONE ENCOUNTER
Seble is calling and states that she has covid and just tested positive.  Symptoms are fatigue and runny nose and chest feels congested.  Slight fever.  Today Seble has questions about quarantine and vaccinations.    COVID 19 Nurse Triage Plan/Patient Instructions    Please be aware that novel coronavirus (COVID-19) may be circulating in the community. If you develop symptoms such as fever, cough, or SOB or if you have concerns about the presence of another infection including coronavirus (COVID-19), please contact your health care provider or visit [email or web address].     Disposition/Instructions    Home care recommended. Follow home care protocol based instructions.    Thank you for taking steps to prevent the spread of this virus.  o Limit your contact with others.  o Wear a simple mask to cover your cough.  o Wash your hands well and often.    Resources    [facility]: About COVID-19: [email or web address]    CDC: What to Do If You're Sick: www.cdc.gov/coronavirus/2019-ncov/about/steps-when-sick.html    CDC: Ending Home Isolation: www.cdc.gov/coronavirus/2019-ncov/hcp/disposition-in-home-patients.html     CDC: Caring for Someone: www.cdc.gov/coronavirus/2019-ncov/if-you-are-sick/care-for-someone.html     [facility]: Interim Guidance for Hospital Discharge to Home: www.health.[facility].mn.us/diseases/coronavirus/hcp/hospdischarge.pdf    [facility] clinical trials (COVID-19 research studies): clinicalaffairs.[facility]-clinical-trials     Below are the COVID-19 hotlines at the [facility] of Health ([facility]). Interpreters are available.   o For health questions: Call 199-399-3357 or 1-571.396.8312 (7 a.m. to 7 p.m.)  o For questions about schools and childcare: Call 126-502-6613 or 1-932.268.4267 (7 a.m. to 7 p.m.)                     Reason for Disposition    COVID-19 Testing, questions about    Additional Information    Negative: SEVERE difficulty breathing (e.g., struggling for  each breath, speaks in single words)    Negative: Difficult to awaken or acting confused (e.g., disoriented, slurred speech)    Negative: Bluish (or gray) lips or face now    Negative: Shock suspected (e.g., cold/pale/clammy skin, too weak to stand, low BP, rapid pulse)    Negative: Sounds like a life-threatening emergency to the triager    Negative: SEVERE or constant chest pain or pressure (Exception: mild central chest pain, present only when coughing)    Negative: MODERATE difficulty breathing (e.g., speaks in phrases, SOB even at rest, pulse 100-120)    Negative: [1] Headache AND [2] stiff neck (can't touch chin to chest)    Negative: MILD difficulty breathing (e.g., minimal/no SOB at rest, SOB with walking, pulse <100)    Negative: Chest pain or pressure    Negative: Patient sounds very sick or weak to the triager    Negative: Fever > 103 F (39.4 C)    Negative: [1] Fever > 101 F (38.3 C) AND [2] age > 60    Negative: [1] Fever > 100.0 F (37.8 C) AND [2] bedridden (e.g., nursing home patient, CVA, chronic illness, recovering from surgery)    Negative: [1] HIGH RISK patient (e.g., age > 64 years, diabetes, heart or lung disease, weak immune system) AND [2] new or worsening symptoms    Negative: [1] HIGH RISK patient AND [2] influenza is widespread in the community AND [3] ONE OR MORE respiratory symptoms: cough, sore throat, runny or stuffy nose    Negative: [1] HIGH RISK patient AND [2] influenza exposure within the last 7 days AND [3] ONE OR MORE respiratory symptoms: cough, sore throat, runny or stuffy nose    Negative: Fever present > 3 days (72 hours)    Negative: [1] Fever returns after gone for over 24 hours AND [2] symptoms worse or not improved    Negative: [1] Continuous (nonstop) coughing interferes with work or school AND [2] no improvement using cough treatment per protocol    Negative: [1] COVID-19 infection suspected by caller or triager AND [2] mild symptoms (cough, fever, or others) AND [3] no  complications or SOB    Negative: Cough present > 3 weeks    Negative: [1] COVID-19 diagnosed by positive lab test AND [2] mild symptoms (e.g., cough, fever, others) AND [3] no complications or SOB    Negative: [1] COVID-19 diagnosed by HCP (doctor, NP or PA) AND [2] mild symptoms (e.g., cough, fever, others) AND [3] no complications or SOB    Negative: COVID-19 Home Isolation, questions about    Protocols used: CORONAVIRUS (COVID-19) DIAGNOSED OR MYKOIBLDU-I-CY 1.3

## 2021-04-10 ENCOUNTER — HEALTH MAINTENANCE LETTER (OUTPATIENT)
Age: 46
End: 2021-04-10

## 2021-04-24 ENCOUNTER — NURSE TRIAGE (OUTPATIENT)
Dept: NURSING | Facility: CLINIC | Age: 46
End: 2021-04-24

## 2021-04-24 DIAGNOSIS — Z23 HIGH PRIORITY FOR 2019 NOVEL CORONAVIRUS VACCINATION: Primary | ICD-10-CM

## 2021-04-24 NOTE — TELEPHONE ENCOUNTER
Wanted to confirm, got first Pfizer shot and then got Covid so was not able to keep second appointment. Second shot was due April 14, 21.  She is now able to get the second shot, and would like to reschedule.      Pfizer at [place] site on March 23  Lot [id number] . 4-26-21    Request for 2nd COVID-19 vaccination due to 1st dose being received second dose was delayed due to timing issue and no existing Epic order -PROCEED     RN obtained the following information from: Patient      Any vaccine in previous 14 days? NO - Okay to Proceed    The name of 1st dose vaccine product received as first dose: Pfizer-BioNTech    Date 1st dose vaccination was given: 3-23-21    Lot #: [id number]     The 2nd dose of the mRNA COVID-19 vaccine product should be the same vaccine product as the 1st dose and be administered within appropriate timeframe.     Based on the information collected, the patient should receive the vaccine after 4-14-21 date.     Note: If the acceptable timeframe for the 2nd dose falls in the past, the patient should obtain the 2nd dose as soon as possible. Select today s date within the order and indicate details within the order comments.      Before placing the order, Confirm patient is appropriate for the vaccine product they received:  o Moderna: 18 Years   o Pfizer-BioNTech: 16 Years      Patient reminded that CONSENT will be needed at the time of the vaccine.    Patient reminded to bring vaccine card or documentation to verify first dose.    Remind the patient not to get any other vaccinations between now and the appointment, unless instructed by their provider.      Copy blue text above regarding the 1st dose and paste into appropriate order:    MODERNA COVID-19 VACCINE 2ND DOSE APPT; OR    PFIZER COVID-19 VACCINE 2ND DOSE APPT    Update Expected Date in order to reflect date in copied text    Dx Code Z23 HIGH PRIORITY FOR 2019-nCoV vaccine      Ama Ward RN  [facility]

## 2021-04-26 ENCOUNTER — IMMUNIZATION (OUTPATIENT)
Dept: NURSING | Facility: CLINIC | Age: 46
End: 2021-04-26
Attending: OTOLARYNGOLOGY
Payer: COMMERCIAL

## 2021-04-26 DIAGNOSIS — Z23 HIGH PRIORITY FOR 2019 NOVEL CORONAVIRUS VACCINATION: ICD-10-CM

## 2021-04-26 PROCEDURE — 0002A PR COVID VAC PFIZER DIL RECON 30 MCG/0.3 ML IM: CPT

## 2021-04-26 PROCEDURE — 91300 PR COVID VAC PFIZER DIL RECON 30 MCG/0.3 ML IM: CPT

## 2021-08-27 NOTE — PROGRESS NOTES
"    Assessment & Plan     Infection due to 2019 novel coronavirus  Possible post COVID symptoms.  - Adult Post Covid Clinic Referral; Future    Other fatigue  Will screen  For some other possible reasons  - Adult Post Covid Clinic Referral; Future  - CBC with platelets and differential; Future  - Comprehensive metabolic panel (BMP + Alb, Alk Phos, ALT, AST, Total. Bili, TP); Future  - TSH with free T4 reflex; Future  - CBC with platelets and differential  - Comprehensive metabolic panel (BMP + Alb, Alk Phos, ALT, AST, Total. Bili, TP)  - TSH with free T4 reflex      Due for physical, and plans to follow up for that.       BMI:   Estimated body mass index is 32.93 kg/m  as calculated from the following:    Height as of this encounter: 1.676 m (5' 6\").    Weight as of this encounter: 92.5 kg (204 lb).   Weight management plan: Discussed healthy diet and exercise guidelines        No follow-ups on file.    Ibrahima Amador PA-C  [facility]    Casandra Pruett is a 46 year old who presents for the following health issues     HPI       Concern for COVID-19  About how many days ago did these symptoms start? Since she had covid back in April   Is this your first visit for this illness? Yes  In the 14 days before your symptoms started, have you had close contact with someone with COVID-19 (Coronavirus)? No  Do you have a fever or chills? No  Are you having new or worsening difficulty breathing? Yes   Please describe what kind of difficulty you are having breathing:  Do you have new or worsening cough? No  Have you had any new or unexplained body aches? No    Have you experienced any of the following NEW symptoms?    Headache: No but gets headaches    Sore throat: No    Loss of taste or smell: No    Chest pain: congestion    Diarrhea: YES    Rash: No  What treatments have you tried? Rest and allergy med  Who do you live with? 3 kids. All had covid at the same time  Are you, or a household member, " "a healthcare worker or a ? No  Do you live in a nursing home, group home, or shelter? No  Do you have a way to get food/medications if quarantined? n/a        Review of Systems   Constitutional, HEENT, cardiovascular, pulmonary, gi and gu systems are negative, except as otherwise noted.      Objective    /80   Pulse 85   Temp 98.5  F (36.9  C) (Oral)   Resp 14   Ht 1.676 m (5' 6\")   Wt 92.5 kg (204 lb)   LMP 08/09/2021   SpO2 97%   Breastfeeding No   BMI 32.93 kg/m    Body mass index is 32.93 kg/m .  Physical Exam   GENERAL: alert and no distress  EYES: Eyes grossly normal to inspection  NECK: no adenopathy, no asymmetry, masses, or scars and thyroid normal to palpation  RESP: lungs clear to auscultation - no rales, rhonchi or wheezes  CV: regular rate and rhythm, normal S1 S2, no S3 or S4, no murmur, click or rub, no peripheral edema and peripheral pulses strong  PSYCH: mentation appears normal, affect normal/bright                "

## 2021-08-30 ENCOUNTER — OFFICE VISIT (OUTPATIENT)
Dept: FAMILY MEDICINE | Facility: CLINIC | Age: 46
End: 2021-08-30
Payer: COMMERCIAL

## 2021-08-30 VITALS
RESPIRATION RATE: 14 BRPM | HEIGHT: 66 IN | DIASTOLIC BLOOD PRESSURE: 80 MMHG | BODY MASS INDEX: 32.78 KG/M2 | HEART RATE: 85 BPM | SYSTOLIC BLOOD PRESSURE: 118 MMHG | OXYGEN SATURATION: 97 % | WEIGHT: 204 LBS | TEMPERATURE: 98.5 F

## 2021-08-30 DIAGNOSIS — R53.83 OTHER FATIGUE: ICD-10-CM

## 2021-08-30 DIAGNOSIS — U07.1 INFECTION DUE TO 2019 NOVEL CORONAVIRUS: Primary | ICD-10-CM

## 2021-08-30 LAB
BASOPHILS # BLD AUTO: 0 10E3/UL (ref 0–0.2)
BASOPHILS NFR BLD AUTO: 0 %
EOSINOPHIL # BLD AUTO: 0.1 10E3/UL (ref 0–0.7)
EOSINOPHIL NFR BLD AUTO: 1 %
ERYTHROCYTE [DISTWIDTH] IN BLOOD BY AUTOMATED COUNT: 11.9 % (ref 10–15)
HCT VFR BLD AUTO: 37.8 % (ref 35–47)
HGB BLD-MCNC: 12.6 G/DL (ref 11.7–15.7)
LYMPHOCYTES # BLD AUTO: 2 10E3/UL (ref 0.8–5.3)
LYMPHOCYTES NFR BLD AUTO: 27 %
MCH RBC QN AUTO: 29.9 PG (ref 26.5–33)
MCHC RBC AUTO-ENTMCNC: 33.3 G/DL (ref 31.5–36.5)
MCV RBC AUTO: 90 FL (ref 78–100)
MONOCYTES # BLD AUTO: 0.6 10E3/UL (ref 0–1.3)
MONOCYTES NFR BLD AUTO: 8 %
NEUTROPHILS # BLD AUTO: 4.6 10E3/UL (ref 1.6–8.3)
NEUTROPHILS NFR BLD AUTO: 64 %
PLATELET # BLD AUTO: 265 10E3/UL (ref 150–450)
RBC # BLD AUTO: 4.21 10E6/UL (ref 3.8–5.2)
WBC # BLD AUTO: 7.2 10E3/UL (ref 4–11)

## 2021-08-30 PROCEDURE — 99213 OFFICE O/P EST LOW 20 MIN: CPT | Performed by: PHYSICIAN ASSISTANT

## 2021-08-30 PROCEDURE — 80050 GENERAL HEALTH PANEL: CPT | Performed by: PHYSICIAN ASSISTANT

## 2021-08-30 PROCEDURE — 36415 COLL VENOUS BLD VENIPUNCTURE: CPT | Performed by: PHYSICIAN ASSISTANT

## 2021-08-30 ASSESSMENT — MIFFLIN-ST. JEOR: SCORE: 1577.09

## 2021-08-31 LAB
ALBUMIN SERPL-MCNC: 3.8 G/DL (ref 3.4–5)
ALP SERPL-CCNC: 55 U/L (ref 40–150)
ALT SERPL W P-5'-P-CCNC: 17 U/L (ref 0–50)
ANION GAP SERPL CALCULATED.3IONS-SCNC: 4 MMOL/L (ref 3–14)
AST SERPL W P-5'-P-CCNC: 15 U/L (ref 0–45)
BILIRUB SERPL-MCNC: 0.8 MG/DL (ref 0.2–1.3)
BUN SERPL-MCNC: 10 MG/DL (ref 7–30)
CALCIUM SERPL-MCNC: 8.8 MG/DL (ref 8.5–10.1)
CHLORIDE BLD-SCNC: 107 MMOL/L (ref 94–109)
CO2 SERPL-SCNC: 26 MMOL/L (ref 20–32)
CREAT SERPL-MCNC: 0.87 MG/DL (ref 0.52–1.04)
GFR SERPL CREATININE-BSD FRML MDRD: 80 ML/MIN/1.73M2
GLUCOSE BLD-MCNC: 92 MG/DL (ref 70–99)
POTASSIUM BLD-SCNC: 3.8 MMOL/L (ref 3.4–5.3)
PROT SERPL-MCNC: 7.6 G/DL (ref 6.8–8.8)
SODIUM SERPL-SCNC: 137 MMOL/L (ref 133–144)
TSH SERPL DL<=0.005 MIU/L-ACNC: 0.76 MU/L (ref 0.4–4)

## 2021-08-31 NOTE — RESULT ENCOUNTER NOTE
Dear Seble    Your test results are attached, feel free to contact me via [employer]     Everything looks good on your lab.  If symptoms persist or worsen, I would follow up with the COVID clinic for further evaluation.     Elijah Amador PA-C

## 2021-09-19 ENCOUNTER — HEALTH MAINTENANCE LETTER (OUTPATIENT)
Age: 46
End: 2021-09-19

## 2021-09-30 ENCOUNTER — VIRTUAL VISIT (OUTPATIENT)
Dept: PHYSICAL MEDICINE AND REHAB | Facility: CLINIC | Age: 46
End: 2021-09-30
Attending: PHYSICIAN ASSISTANT
Payer: COMMERCIAL

## 2021-09-30 DIAGNOSIS — U07.1 INFECTION DUE TO 2019 NOVEL CORONAVIRUS: ICD-10-CM

## 2021-09-30 DIAGNOSIS — R53.83 OTHER FATIGUE: ICD-10-CM

## 2021-09-30 PROCEDURE — 99202 OFFICE O/P NEW SF 15 MIN: CPT | Mod: 95 | Performed by: PHYSICAL MEDICINE & REHABILITATION

## 2021-09-30 SDOH — SOCIAL STABILITY: SOCIAL NETWORK: I HAVE TROUBLE DOING ALL OF THE ACTIVITIES WITH FRIENDS THAT I WANT TO DO: SOMETIMES

## 2021-09-30 SDOH — SOCIAL STABILITY: SOCIAL NETWORK: I HAVE TROUBLE DOING ALL OF MY USUAL WORK (INCLUDE WORK AT HOME): SOMETIMES

## 2021-09-30 SDOH — SOCIAL STABILITY: SOCIAL NETWORK: PROMIS ABILITY TO PARTICIPATE IN SOCIAL ROLES & ACTIVITIES T-SCORE: 43.2

## 2021-09-30 SDOH — SOCIAL STABILITY: SOCIAL NETWORK: I HAVE TROUBLE DOING ALL OF MY REGULAR LEISURE ACTIVITIES WITH OTHERS: SOMETIMES

## 2021-09-30 SDOH — SOCIAL STABILITY: SOCIAL NETWORK: I HAVE TROUBLE DOING ALL OF THE FAMILY ACTIVITIES THAT I WANT TO DO: USUALLY

## 2021-09-30 ASSESSMENT — ANXIETY QUESTIONNAIRES
8. IF YOU CHECKED OFF ANY PROBLEMS, HOW DIFFICULT HAVE THESE MADE IT FOR YOU TO DO YOUR WORK, TAKE CARE OF THINGS AT HOME, OR GET ALONG WITH OTHER PEOPLE?: NOT DIFFICULT AT ALL
6. BECOMING EASILY ANNOYED OR IRRITABLE: MORE THAN HALF THE DAYS
4. TROUBLE RELAXING: SEVERAL DAYS
7. FEELING AFRAID AS IF SOMETHING AWFUL MIGHT HAPPEN: NOT AT ALL
7. FEELING AFRAID AS IF SOMETHING AWFUL MIGHT HAPPEN: NOT AT ALL
1. FEELING NERVOUS, ANXIOUS, OR ON EDGE: NOT AT ALL
5. BEING SO RESTLESS THAT IT IS HARD TO SIT STILL: NOT AT ALL
GAD7 TOTAL SCORE: 4
GAD7 TOTAL SCORE: 4
3. WORRYING TOO MUCH ABOUT DIFFERENT THINGS: SEVERAL DAYS
2. NOT BEING ABLE TO STOP OR CONTROL WORRYING: NOT AT ALL
GAD7 TOTAL SCORE: 4

## 2021-09-30 ASSESSMENT — ENCOUNTER SYMPTOMS
NIGHT SWEATS: 1
ORTHOPNEA: 0
DECREASED APPETITE: 0
NERVOUS/ANXIOUS: 1
SPUTUM PRODUCTION: 0
COUGH: 0
PALPITATIONS: 1
WHEEZING: 0
SYNCOPE: 0
SHORTNESS OF BREATH: 0
WEIGHT GAIN: 1
HYPERTENSION: 0
DYSPNEA ON EXERTION: 1
BLOOD IN STOOL: 0
POSTURAL DYSPNEA: 0
EXERCISE INTOLERANCE: 0
DIARRHEA: 1
HYPOTENSION: 0
LIGHT-HEADEDNESS: 0
ABDOMINAL PAIN: 0
RECTAL PAIN: 0
DECREASED CONCENTRATION: 1
BLOATING: 1
SLEEP DISTURBANCES DUE TO BREATHING: 0
FEVER: 0
PANIC: 0
DEPRESSION: 1
SNORES LOUDLY: 1
HEARTBURN: 1
INSOMNIA: 1
ALTERED TEMPERATURE REGULATION: 1
POLYPHAGIA: 1
WEIGHT LOSS: 0
BOWEL INCONTINENCE: 0
CHILLS: 1
HEMOPTYSIS: 0
POLYDIPSIA: 0
INCREASED ENERGY: 1
HALLUCINATIONS: 0
VOMITING: 0
FATIGUE: 1
NAUSEA: 0
JAUNDICE: 0
COUGH DISTURBING SLEEP: 0
LEG PAIN: 0
CONSTIPATION: 0

## 2021-09-30 ASSESSMENT — PATIENT HEALTH QUESTIONNAIRE - PHQ9
10. IF YOU CHECKED OFF ANY PROBLEMS, HOW DIFFICULT HAVE THESE PROBLEMS MADE IT FOR YOU TO DO YOUR WORK, TAKE CARE OF THINGS AT HOME, OR GET ALONG WITH OTHER PEOPLE: SOMEWHAT DIFFICULT
SUM OF ALL RESPONSES TO PHQ QUESTIONS 1-9: 10
SUM OF ALL RESPONSES TO PHQ QUESTIONS 1-9: 10

## 2021-09-30 NOTE — LETTER
2021       RE: Seble Lopez  [address]  [facility] [address]     Dear Colleague,    Thank you for referring your patient, Seble Lopez, to the [facility] [place] at [facility]. Please see a copy of my visit note below.         PM&R Clinic Note     Patient Name: Seble Lopez : 1975 Medical Record: [id number]     Requesting Physician/clinician: Ibrahima Amador, *         History of Present Illness:     Seble Lopez is a 46 year old female that per records and reporting patient she states about 6 months ago.  Got first vaccine, than that day, felt off but child was sick as well.  Son was positive and she was as well.  Than whole family tested positive.  3-4 weeks was very sick.  Missed some work.  She gets bout of very fatigue and hard to get through work day, than exhausted.   Barely able to do things.   Sometimes feels chest ache, breathing issues, or shallow.  Sometimes a pain as well.   She does get occasional headache as well.  Does get occasional hotness, drenched in sweat.  Has put on ton of weight.  Not exercising as much as precovid.            Past Medical and Surgical History:     Past Medical History:   Diagnosis Date     Allergic rhinitis, cause unspecified     takes prn claritin     No past surgical history on file.         Social History:     Social History     Tobacco Use     Smoking status: Never Smoker     Smokeless tobacco: Never Used   Substance Use Topics     Alcohol use: Yes     Alcohol/week: 0.0 standard drinks     Comment: Occas       Living situation: house  Family support: yes   Vocational History:  Marketing   Recreational drug use: none         Functional history:     Seble Lopez is independent with all aspects of  life.    Assistive devices: none  Hand dominance: R  Driving: yes          Family History:     Family History   Problem Relation Age of Onset  "    Coronary Artery Disease Mother      Family History Negative Father             Medications:     No current outpatient medications on file.            Allergies:     Allergies   Allergen Reactions     Penicillin [Penicillins] Rash              ROS:        ROS: 10 point ROS neg other than the symptoms noted above in the HPI.           Physical Examiniation:     VITAL SIGNS: There were no vitals taken for this visit.  BMI: Estimated body mass index is 32.93 kg/m  as calculated from the following:    Height as of 8/30/21: 1.676 m (5' 6\").    Weight as of 8/30/21: 92.5 kg (204 lb).    EXAM:  Patient is interacting and in no acute distress.  Awake and alert.  No facial trauma or apparent cranial nerve deficit  No aphasia present  No deformities or rashes noted               Laboratory/Imaging:     COVID-19 PCR Results    COVID-19 PCR Results 9/16/20   COVID-19 Virus PCR to [facility] - Result Not Detected   COVID-19 Virus PCR to [facility] - Source Nasopharyngeal      Comments are available for some flowsheets but are not being displayed.         COVID-19 Antibody Results, Testing for Immunity    COVID-19 Antibody Results, Testing for Immunity   No data to display.             TSH   Date Value Ref Range Status   08/30/2021 0.76 0.40 - 4.00 mU/L Final   12/02/2019 2.15 0.40 - 4.00 mU/L Final          Assessment/Plan:     Seble was seen today for video visit.    Diagnoses and all orders for this visit:    Infection due to 2019 novel coronavirus  -     Adult Post Covid Clinic Referral    Other fatigue  -     Adult Post Covid Clinic Referral              1. Patient education: In depth discussion and education was provided about the assessment and implications of each of the below recommendations for management. Patient indicated readiness to learn, all questions were answered and understanding of material presented was confirmed.    2. Work-up:  None     3. Therapy/equipment/braces: start hme exercise program    4. Medications: " no additions     5. Interventions: discussed exercise and brain health and endurance     6. Referral / follow up with other providers:  PCP    7. Follow up: as needed     Alec Shafer, DO  Physical Medicine & Rehabilitation    I spent a total of 23 minutes face-to-face with Seble Lopez during today's office virtual visit. Over 50% of this time was spent counseling the patient and/or coordinating care. See note for details.     23 minutes spent on the date of the encounter doing chart review, history and exam, documentation and further activities as noted above      Answers for HPI/ROS submitted by the patient on 9/30/2021  If you checked off any problems, how difficult have these problems made it for you to do your work, take care of things at home, or get along with other people?: Somewhat difficult  PHQ9 TOTAL SCORE: 10  MANOLO 7 TOTAL SCORE: 4  General Symptoms: Yes  Skin Symptoms: No  HENT Symptoms: No  EYE SYMPTOMS: No  HEART SYMPTOMS: Yes  LUNG SYMPTOMS: Yes  INTESTINAL SYMPTOMS: Yes  URINARY SYMPTOMS: No  GYNECOLOGIC SYMPTOMS: No  BREAST SYMPTOMS: No  SKELETAL SYMPTOMS: No  BLOOD SYMPTOMS: No  NERVOUS SYSTEM SYMPTOMS: No  MENTAL HEALTH SYMPTOMS: Yes  Fever: No  Loss of appetite: No  Weight loss: No  Weight gain: Yes  Fatigue: Yes  Night sweats: Yes  Chills: Yes  Increased stress: Yes  Excessive hunger: Yes  Excessive thirst: No  Feeling hot or cold when others believe the temperature is normal: Yes  Loss of height: No  Post-operative complications: No  Surgical site pain: No  Hallucinations: No  Change in or Loss of Energy: Yes  Hyperactivity: No  Confusion: No  Chest pain or pressure: Yes  Fast or irregular heartbeat: Yes  Pain in legs with walking: No  Trouble breathing while lying down: No  Fingers or toes appear blue: No  High blood pressure: No  Low blood pressure: No  Fainting: No  Murmurs: No  Pacemaker: No  Varicose veins: No  Edema or swelling: No  Wake up at night with shortness of breath:  No  Light-headedness: No  Exercise intolerance: No  Cough: No  Sputum or phlegm: No  Coughing up blood: No  Difficulty breating or shortness of breath: No  Snoring: Yes  Wheezing: No  Difficulty breathing on exertion: Yes  Nighttime Cough: No  Difficulty breathing when lying flat: No  Heart burn or indigestion: Yes  Nausea: No  Vomiting: No  Abdominal pain: No  Bloating: Yes  Constipation: No  Diarrhea: Yes  Blood in stool: No  Black stools: No  Rectal or Anal pain: No  Fecal incontinence: No  Yellowing of skin or eyes: No  Vomit with blood: No  Change in stools: No  Nervous or Anxious: Yes  Depression: Yes  Trouble sleeping: Yes  Trouble thinking or concentrating: Yes  Mood changes: Yes  Panic attacks: No          Again, thank you for allowing me to participate in the care of your patient.      Sincerely,    Alec Shafer DO

## 2021-10-01 ASSESSMENT — ANXIETY QUESTIONNAIRES: GAD7 TOTAL SCORE: 4

## 2021-10-01 ASSESSMENT — PATIENT HEALTH QUESTIONNAIRE - PHQ9: SUM OF ALL RESPONSES TO PHQ QUESTIONS 1-9: 10

## 2021-10-11 ENCOUNTER — DOCUMENTATION ONLY (OUTPATIENT)
Dept: PHYSICAL MEDICINE AND REHAB | Facility: CLINIC | Age: 46
End: 2021-10-11

## 2021-10-11 NOTE — PROGRESS NOTES
No labs or referrals ordered.  Patient to start home exercise program.  Follow-up with PCP as needed.    Francis Zuniga

## 2021-11-04 ENCOUNTER — OFFICE VISIT (OUTPATIENT)
Dept: FAMILY MEDICINE | Facility: CLINIC | Age: 46
End: 2021-11-04
Payer: COMMERCIAL

## 2021-11-04 VITALS
HEART RATE: 80 BPM | DIASTOLIC BLOOD PRESSURE: 74 MMHG | TEMPERATURE: 97.3 F | OXYGEN SATURATION: 98 % | BODY MASS INDEX: 34.23 KG/M2 | WEIGHT: 213 LBS | SYSTOLIC BLOOD PRESSURE: 109 MMHG | HEIGHT: 66 IN

## 2021-11-04 DIAGNOSIS — Z13.6 CARDIOVASCULAR SCREENING; LDL GOAL LESS THAN 160: ICD-10-CM

## 2021-11-04 DIAGNOSIS — Z12.11 SCREEN FOR COLON CANCER: ICD-10-CM

## 2021-11-04 DIAGNOSIS — F43.21 ADJUSTMENT DISORDER WITH DEPRESSED MOOD: ICD-10-CM

## 2021-11-04 DIAGNOSIS — Z00.00 ROUTINE GENERAL MEDICAL EXAMINATION AT A HEALTH CARE FACILITY: Primary | ICD-10-CM

## 2021-11-04 DIAGNOSIS — Z23 NEED FOR VACCINATION: ICD-10-CM

## 2021-11-04 LAB
CHOLEST SERPL-MCNC: 194 MG/DL
DEPRECATED CALCIDIOL+CALCIFEROL SERPL-MC: 25 UG/L (ref 20–75)
FASTING STATUS PATIENT QL REPORTED: YES
HDLC SERPL-MCNC: 72 MG/DL
LDLC SERPL CALC-MCNC: 112 MG/DL
NONHDLC SERPL-MCNC: 122 MG/DL
TRIGL SERPL-MCNC: 51 MG/DL

## 2021-11-04 PROCEDURE — 80061 LIPID PANEL: CPT | Performed by: PHYSICIAN ASSISTANT

## 2021-11-04 PROCEDURE — 99213 OFFICE O/P EST LOW 20 MIN: CPT | Mod: 25 | Performed by: PHYSICIAN ASSISTANT

## 2021-11-04 PROCEDURE — 99396 PREV VISIT EST AGE 40-64: CPT | Mod: 25 | Performed by: PHYSICIAN ASSISTANT

## 2021-11-04 PROCEDURE — 36415 COLL VENOUS BLD VENIPUNCTURE: CPT | Performed by: PHYSICIAN ASSISTANT

## 2021-11-04 PROCEDURE — 90471 IMMUNIZATION ADMIN: CPT | Performed by: PHYSICIAN ASSISTANT

## 2021-11-04 PROCEDURE — 90686 IIV4 VACC NO PRSV 0.5 ML IM: CPT | Performed by: PHYSICIAN ASSISTANT

## 2021-11-04 PROCEDURE — 82306 VITAMIN D 25 HYDROXY: CPT | Performed by: PHYSICIAN ASSISTANT

## 2021-11-04 RX ORDER — BUPROPION HYDROCHLORIDE 150 MG/1
150 TABLET ORAL EVERY MORNING
Qty: 30 TABLET | Refills: 3 | Status: SHIPPED | OUTPATIENT
Start: 2021-11-04 | End: 2022-02-24

## 2021-11-04 ASSESSMENT — ENCOUNTER SYMPTOMS: BREAST MASS: 0

## 2021-11-04 ASSESSMENT — MIFFLIN-ST. JEOR: SCORE: 1617.91

## 2021-11-04 NOTE — PROGRESS NOTES
SUBJECTIVE:   CC: Seble Lopez is an 46 year old woman who presents for preventive health visit.       Patient has been advised of split billing requirements and indicates understanding: Yes  Healthy Habits:     Getting at least 3 servings of Calcium per day:  Yes    Bi-annual eye exam:  NO    Dental care twice a year:  Yes    Sleep apnea or symptoms of sleep apnea:  None    Diet:  Regular (no restrictions)    Frequency of exercise:  2-3 days/week    Duration of exercise:  15-30 minutes    Taking medications regularly:  Not Applicable    Medication side effects:  Not applicable    PHQ-2 Total Score: 1    Additional concerns today:  Yes      Pt would like talk about some weigh concerns.    Every since she had COVID in the spring, she has put on 30 lbs.  She does feel that most of this is from a motivational aspect.  Not getting exercise like she used to and does feel a slight addiction to food.  She does wonder about options for both her mood and possible weight gain.  She has never tried meds in the past for either.    She is also interested in starting colon cancer screening.    Follows with GYN for mammograms and paps.  She does have upcoming visit with them      Today's PHQ-2 Score:   PHQ-2 ( 1999 Pfizer) 11/4/2021   Q1: Little interest or pleasure in doing things 1   Q2: Feeling down, depressed or hopeless 0   PHQ-2 Score 1   Q1: Little interest or pleasure in doing things Several days   Q2: Feeling down, depressed or hopeless Not at all   PHQ-2 Score 1       Abuse: Current or Past (Physical, Sexual or Emotional) - No  Do you feel safe in your environment? Yes    Have you ever done Advance Care Planning? (For example, a Health Directive, POLST, or a discussion with a medical provider or your loved ones about your wishes): No, advance care planning information given to patient to review.  Patient declined advance care planning discussion at this time.    Social History     Tobacco Use     Smoking status: Never  Smoker     Smokeless tobacco: Never Used   Substance Use Topics     Alcohol use: Yes     Alcohol/week: 0.0 standard drinks     Comment: Occas     If you drink alcohol do you typically have >3 drinks per day or >7 drinks per week? No    Alcohol Use 11/4/2021   Prescreen: >3 drinks/day or >7 drinks/week? No   Prescreen: >3 drinks/day or >7 drinks/week? -   No flowsheet data found.    Reviewed orders with patient.  Reviewed health maintenance and updated orders accordingly - Yes  BP Readings from Last 3 Encounters:   11/04/21 109/74   08/30/21 118/80   07/08/20 108/70    Wt Readings from Last 3 Encounters:   11/04/21 96.6 kg (213 lb)   08/30/21 92.5 kg (204 lb)   07/08/20 93.2 kg (205 lb 8 oz)                    Breast Cancer Screening:  Any new diagnosis of family breast, ovarian, or bowel cancer? No    FHS-7:   Breast CA Risk Assessment (FHS-7) 11/4/2021   Did any of your first-degree relatives have breast or ovarian cancer? No   Did any of your relatives have bilateral breast cancer? No   Did any man in your family have breast cancer? No   Did any woman in your family have breast and ovarian cancer? No   Did any woman in your family have breast cancer before age 50 y? No   Do you have 2 or more relatives with breast and/or ovarian cancer? No   Do you have 2 or more relatives with breast and/or bowel cancer? No       Mammogram Screening: Recommended annual mammography  Pertinent mammograms are reviewed under the imaging tab.    History of abnormal Pap smear: NO - age 30-65 PAP every 5 years with negative HPV co-testing recommended     Reviewed and updated as needed this visit by clinical staff  Tobacco  Allergies               Reviewed and updated as needed this visit by Provider                    Review of Systems   Breasts:  Negative for tenderness, breast mass and discharge.   Genitourinary: Negative for pelvic pain, vaginal bleeding and vaginal discharge.          OBJECTIVE:   /74   Pulse 80   Temp  "97.3  F (36.3  C)   Ht 1.676 m (5' 6\")   Wt 96.6 kg (213 lb)   SpO2 98%   BMI 34.38 kg/m    Physical Exam  GENERAL: alert and no distress  EYES: Eyes grossly normal to inspection, PERRL and conjunctivae and sclerae normal  HENT: ear canals and TM's normal, nose and mouth without ulcers or lesions  NECK: no adenopathy, no asymmetry, masses, or scars and thyroid normal to palpation  RESP: lungs clear to auscultation - no rales, rhonchi or wheezes  CV: regular rate and rhythm, normal S1 S2, no S3 or S4, no murmur, click or rub, no peripheral edema and peripheral pulses strong  PSYCH: mentation appears normal, affect normal/bright    Diagnostic Test Results:  Labs reviewed in Epic    ASSESSMENT/PLAN:   (Z00.00) Routine general medical examination at a health care facility  (primary encounter diagnosis)  Comment:   Plan: Vitamin D Deficiency            (F43.21) Adjustment disorder with depressed mood  Comment: long discussion about options, and she would like to try wellbutrin.  Discussed the benefits and potential side effects of medication and she does understand.  She has never had a seizure  Plan: buPROPion (WELLBUTRIN XL) 150 MG 24 hr tablet            (Z12.11) Screen for colon cancer  Comment:   Plan: COLOGUARD([employer] SCIENCES)            (Z23) Need for vaccination  Comment:   Plan: INFLUENZA VACCINE IM > 6 MONTHS VALENT IIV4         (AFLURIA/FLUZONE)            (Z13.6) CARDIOVASCULAR SCREENING; LDL GOAL LESS THAN 160  Comment:   Plan: Lipid panel reflex to direct LDL Fasting            (Z68.34) BMI 34.0-34.9,adult  Comment: dietary and lifestyle changes discussed  Plan:     Patient has been advised of split billing requirements and indicates understanding: Yes  COUNSELING:  Reviewed preventive health counseling, as reflected in patient instructions       Regular exercise       Healthy diet/nutrition    Estimated body mass index is 34.38 kg/m  as calculated from the following:    Height as of this encounter: " "1.676 m (5' 6\").    Weight as of this encounter: 96.6 kg (213 lb).    Weight management plan: Discussed healthy diet and exercise guidelines    She reports that she has never smoked. She has never used smokeless tobacco.      Counseling Resources:  ATP IV Guidelines  Pooled Cohorts Equation Calculator  Breast Cancer Risk Calculator  BRCA-Related Cancer Risk Assessment: FHS-7 Tool  FRAX Risk Assessment  ICSI Preventive Guidelines  Dietary Guidelines for Americans, 2010  USDA's MyPlate  ASA Prophylaxis  Lung CA Screening    Ibrahima Amador PA-C  [facility]  "

## 2021-11-17 LAB — COLOGUARD-ABSTRACT: NEGATIVE

## 2021-12-01 NOTE — RESULT ENCOUNTER NOTE
Dear Seble    Your test results are attached, feel free to contact me via SIPXt     Your colon cancer screening test was normal.  We will recheck in 3 years.    Elijah Amador PA-C

## 2022-05-01 ENCOUNTER — HEALTH MAINTENANCE LETTER (OUTPATIENT)
Age: 47
End: 2022-05-01

## 2022-05-25 NOTE — PROGRESS NOTES
"Seble is a 46 year old who is being evaluated via a billable video visit.      How would you like to obtain your AVS? MyChart  If the video visit is dropped, the invitation should be resent by: Text to cell phone: [phone]  Will anyone else be joining your video visit? No    Video Start Time: 11:35 AM    Assessment & Plan     Flying phobia  Discussed potential benefit/side effect of med, not to use with alcohol.  - ALPRAZolam (XANAX) 0.25 MG tablet; 0.25 mg PO 20-30 minutes before flight.    Rash  Hard to distinguish on exam, video quality poor, will treat based on history.  - ketoconazole (NIZORAL) 2 % external cream; Apply topically daily             BMI:   Estimated body mass index is 34.38 kg/m  as calculated from the following:    Height as of 11/4/21: 1.676 m (5' 6\").    Weight as of 11/4/21: 96.6 kg (213 lb).           Return in about 6 months (around 11/27/2022).    Ibrahima Amador PA-C  [facility]   Seble is a 46 year old who presents for the following health issues     History of Present Illness       Reason for visit:  Need RX for flying    She eats 2-3 servings of fruits and vegetables daily.She consumes 0 sweetened beverage(s) daily.She exercises with enough effort to increase her heart rate 10 to 19 minutes per day.  She exercises with enough effort to increase her heart rate 3 or less days per week. She is missing 2 dose(s) of medications per week.  She is not taking prescribed medications regularly due to remembering to take.     Patient would like anxiety/sleep medication    Also has rash around nasal folds, dry flaky.  Tried almost everything OTC      Social History     Tobacco Use     Smoking status: Never Smoker     Smokeless tobacco: Never Used   Substance Use Topics     Alcohol use: Yes     Alcohol/week: 0.0 standard drinks     Comment: Occas     Drug use: No     MANOLO-7 SCORE 9/30/2021   Total Score 4 (minimal anxiety)   Total Score 4     PHQ 9/30/2021 " 5/20/2022   PHQ-9 Total Score 10 5   Q9: Thoughts of better off dead/self-harm past 2 weeks Not at all Not at all           Review of Systems   Constitutional, HEENT, cardiovascular, pulmonary, gi and gu systems are negative, except as otherwise noted.      Objective           Vitals:  No vitals were obtained today due to virtual visit.    Physical Exam   GENERAL: Healthy, alert and no distress  EYES: Eyes grossly normal to inspection.  No discharge or erythema, or obvious scleral/conjunctival abnormalities.  RESP: No audible wheeze, cough, or visible cyanosis.  No visible retractions or increased work of breathing.    SKIN: Visible skin clear. No significant rash, abnormal pigmentation or lesions.  NEURO: Cranial nerves grossly intact.  Mentation and speech appropriate for age.  PSYCH: Mentation appears normal, affect normal/bright, judgement and insight intact, normal speech and appearance well-groomed.                Video-Visit Details    Type of service:  Video Visit    Video End Time:11:47 AM    Originating Location (pt. Location): Home    Distant Location (provider location):  [facility]     Platform used for Video Visit: MoboFree

## 2022-05-27 ENCOUNTER — VIRTUAL VISIT (OUTPATIENT)
Dept: FAMILY MEDICINE | Facility: CLINIC | Age: 47
End: 2022-05-27
Payer: COMMERCIAL

## 2022-05-27 DIAGNOSIS — F40.243 FLYING PHOBIA: Primary | ICD-10-CM

## 2022-05-27 DIAGNOSIS — R21 RASH: ICD-10-CM

## 2022-05-27 PROCEDURE — 99213 OFFICE O/P EST LOW 20 MIN: CPT | Mod: 95 | Performed by: PHYSICIAN ASSISTANT

## 2022-05-27 RX ORDER — KETOCONAZOLE 20 MG/G
CREAM TOPICAL DAILY
Qty: 15 G | Refills: 0 | Status: SHIPPED | OUTPATIENT
Start: 2022-05-27 | End: 2022-10-21

## 2022-05-27 RX ORDER — ALPRAZOLAM 0.25 MG
TABLET ORAL
Qty: 7 TABLET | Refills: 0 | Status: SHIPPED | OUTPATIENT
Start: 2022-05-27

## 2022-11-21 ENCOUNTER — HEALTH MAINTENANCE LETTER (OUTPATIENT)
Age: 47
End: 2022-11-21

## 2022-12-25 ENCOUNTER — HEALTH MAINTENANCE LETTER (OUTPATIENT)
Age: 47
End: 2022-12-25

## 2023-05-05 ENCOUNTER — TRANSFERRED RECORDS (OUTPATIENT)
Dept: HEALTH INFORMATION MANAGEMENT | Facility: CLINIC | Age: 48
End: 2023-05-05
Payer: COMMERCIAL

## 2023-05-15 ENCOUNTER — ANCILLARY ORDERS (OUTPATIENT)
Dept: MRI IMAGING | Facility: CLINIC | Age: 48
End: 2023-05-15

## 2023-05-15 DIAGNOSIS — D21.9 FIBROIDS: ICD-10-CM

## 2023-05-27 ENCOUNTER — HOSPITAL ENCOUNTER (OUTPATIENT)
Dept: MRI IMAGING | Facility: CLINIC | Age: 48
Discharge: HOME | End: 2023-05-27
Attending: RADIOLOGY | Admitting: RADIOLOGY
Payer: COMMERCIAL

## 2023-05-27 DIAGNOSIS — D21.9 FIBROIDS: ICD-10-CM

## 2023-05-27 PROCEDURE — 255N000002 HC RX 255 OP 636: Performed by: RADIOLOGY

## 2023-05-27 PROCEDURE — A9585 GADOBUTROL INJECTION: HCPCS | Performed by: RADIOLOGY

## 2023-05-27 PROCEDURE — 72197 MRI PELVIS W/O & W/DYE: CPT

## 2023-05-27 RX ORDER — GADOBUTROL 604.72 MG/ML
10 INJECTION INTRAVENOUS ONCE
Status: COMPLETED | OUTPATIENT
Start: 2023-05-27 | End: 2023-05-27

## 2023-05-27 RX ADMIN — GADOBUTROL 10 ML: 604.72 INJECTION INTRAVENOUS at 12:20

## 2023-09-10 ENCOUNTER — ANCILLARY ORDERS (OUTPATIENT)
Dept: MRI IMAGING | Facility: CLINIC | Age: 48
End: 2023-09-10

## 2023-09-10 DIAGNOSIS — N92.0 MENORRHAGIA: ICD-10-CM

## 2023-09-10 DIAGNOSIS — N94.6 DYSMENORRHEA: Primary | ICD-10-CM

## 2023-09-10 DIAGNOSIS — D25.9 UTERINE FIBROID: ICD-10-CM

## 2023-12-09 ENCOUNTER — HOSPITAL ENCOUNTER (OUTPATIENT)
Dept: MRI IMAGING | Facility: CLINIC | Age: 48
Discharge: HOME | End: 2023-12-09
Admitting: PHYSICIAN ASSISTANT
Payer: COMMERCIAL

## 2023-12-09 DIAGNOSIS — N92.0 MENORRHAGIA: ICD-10-CM

## 2023-12-09 DIAGNOSIS — D25.9 UTERINE FIBROID: ICD-10-CM

## 2023-12-09 DIAGNOSIS — N94.6 DYSMENORRHEA: ICD-10-CM

## 2023-12-09 PROCEDURE — 255N000002 HC RX 255 OP 636

## 2023-12-09 PROCEDURE — A9585 GADOBUTROL INJECTION: HCPCS

## 2023-12-09 PROCEDURE — 72197 MRI PELVIS W/O & W/DYE: CPT

## 2023-12-09 RX ORDER — GADOBUTROL 604.72 MG/ML
9 INJECTION INTRAVENOUS ONCE
Status: COMPLETED | OUTPATIENT
Start: 2023-12-09 | End: 2023-12-09

## 2023-12-09 RX ADMIN — GADOBUTROL 9 ML: 604.72 INJECTION INTRAVENOUS at 10:11

## 2024-04-14 ENCOUNTER — HEALTH MAINTENANCE LETTER (OUTPATIENT)
Age: 49
End: 2024-04-14

## 2024-09-01 ENCOUNTER — HEALTH MAINTENANCE LETTER (OUTPATIENT)
Age: 49
End: 2024-09-01

## 2024-11-26 DIAGNOSIS — Z12.11 COLON CANCER SCREENING: ICD-10-CM

## 2024-12-10 ENCOUNTER — ORDERS ONLY (AUTO-RELEASED) (OUTPATIENT)
Dept: ADMISSION | Facility: CLINIC | Age: 49
End: 2024-12-10
Payer: COMMERCIAL

## 2024-12-10 DIAGNOSIS — Z12.11 COLON CANCER SCREENING: ICD-10-CM

## 2025-03-01 LAB — NONINV COLON CA DNA+OCC BLD SCRN STL QL: NORMAL

## 2025-04-19 ENCOUNTER — HEALTH MAINTENANCE LETTER (OUTPATIENT)
Age: 50
End: 2025-04-19